# Patient Record
Sex: FEMALE | Race: BLACK OR AFRICAN AMERICAN | NOT HISPANIC OR LATINO | Employment: UNEMPLOYED | URBAN - METROPOLITAN AREA
[De-identification: names, ages, dates, MRNs, and addresses within clinical notes are randomized per-mention and may not be internally consistent; named-entity substitution may affect disease eponyms.]

---

## 2017-02-11 ENCOUNTER — OFFICE VISIT (OUTPATIENT)
Dept: FAMILY MEDICINE CLINIC | Facility: CLINIC | Age: 22
End: 2017-02-11

## 2017-02-11 VITALS
OXYGEN SATURATION: 98 % | SYSTOLIC BLOOD PRESSURE: 118 MMHG | HEART RATE: 89 BPM | TEMPERATURE: 98.3 F | HEIGHT: 63 IN | BODY MASS INDEX: 25.16 KG/M2 | WEIGHT: 142 LBS | DIASTOLIC BLOOD PRESSURE: 72 MMHG

## 2017-02-11 DIAGNOSIS — B00.9 HERPES: ICD-10-CM

## 2017-02-11 DIAGNOSIS — Z01.419 VISIT FOR GYNECOLOGIC EXAMINATION: Primary | ICD-10-CM

## 2017-02-11 DIAGNOSIS — L20.9 ATOPIC DERMATITIS, UNSPECIFIED TYPE: ICD-10-CM

## 2017-02-11 DIAGNOSIS — G47.00 INSOMNIA, UNSPECIFIED TYPE: ICD-10-CM

## 2017-02-11 LAB
BACTERIA UR QL AUTO: ABNORMAL /HPF
BILIRUB UR QL STRIP: NEGATIVE
CLARITY UR: CLEAR
COLOR UR: YELLOW
GLUCOSE UR STRIP-MCNC: NEGATIVE MG/DL
HGB UR QL STRIP.AUTO: NEGATIVE
KETONES UR QL STRIP: ABNORMAL
LEUKOCYTE ESTERASE UR QL STRIP.AUTO: NEGATIVE
NITRITE UR QL STRIP: NEGATIVE
PH UR STRIP.AUTO: 6.5 [PH] (ref 4.6–8)
PROT UR QL STRIP: NEGATIVE
RBC # UR: ABNORMAL /HPF
REF LAB TEST METHOD: ABNORMAL
SP GR UR STRIP: 1.02 (ref 1–1.03)
SQUAMOUS #/AREA URNS HPF: ABNORMAL /HPF
UROBILINOGEN UR QL STRIP: ABNORMAL
WBC UR QL AUTO: ABNORMAL /HPF

## 2017-02-11 PROCEDURE — 81001 URINALYSIS AUTO W/SCOPE: CPT | Performed by: NURSE PRACTITIONER

## 2017-02-11 PROCEDURE — 99395 PREV VISIT EST AGE 18-39: CPT | Performed by: NURSE PRACTITIONER

## 2017-02-11 PROCEDURE — 99212 OFFICE O/P EST SF 10 MIN: CPT | Performed by: NURSE PRACTITIONER

## 2017-02-11 RX ORDER — AMITRIPTYLINE HYDROCHLORIDE 10 MG/1
TABLET, FILM COATED ORAL
Qty: 180 TABLET | Refills: 3 | Status: SHIPPED | OUTPATIENT
Start: 2017-02-11 | End: 2018-01-05 | Stop reason: SDUPTHER

## 2017-02-11 RX ORDER — ACYCLOVIR 400 MG/1
TABLET ORAL
Qty: 15 TABLET | Refills: 5 | Status: SHIPPED | OUTPATIENT
Start: 2017-02-11 | End: 2020-03-20 | Stop reason: SDUPTHER

## 2017-02-11 RX ORDER — TRIAMCINOLONE ACETONIDE 1 MG/G
CREAM TOPICAL 2 TIMES DAILY
Qty: 30 G | Refills: 1 | Status: SHIPPED | OUTPATIENT
Start: 2017-02-11 | End: 2018-01-24 | Stop reason: SDUPTHER

## 2017-02-11 NOTE — PATIENT INSTRUCTIONS
CBE, Pap, Pelvic today, will cont off OCP and monitor enc safe sex, refill elavil 10 mg 2 PO HS, triamcinolone cream aaa prn eczema, and salicylic acid shampoo

## 2017-02-11 NOTE — PROGRESS NOTES
Subjective   Celestino Ba is a 21 y.o. female.     History of Present Illness   Celestino Ba 21 y.o. female  0, Para 0 female who presents for their yearly GYN exam. Periods are regular every 28-30 days, lasting 5 days. Dysmenorrhea:none. Cyclic symptoms include none. No intermenstrual bleeding, spotting, or discharge.  She reports not having additional complaints of breast pain, lumps, nipple dc, vag odor, itch, pain, dc, or dysuria.  Current contraception:  no method at present time, was prev on OCP but states not currently sexually active and doesn't want to take at this time  History of abnormal Pap smear: no never had  Family history of uterine or ovarian cancer: no  Family history of colon cancer: no  History of abnormal mammogram: no never had  Family history of breast cancer: yes - paternal grandmother  Patient declines STD screening. 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    Review of Systems   Constitutional: Negative for fever.   Respiratory: Negative for cough, shortness of breath and wheezing.    Cardiovascular: Negative for chest pain.   Genitourinary: Negative for dysuria, flank pain, frequency, genital sores, menstrual problem, pelvic pain, urgency, vaginal bleeding, vaginal discharge and vaginal pain.   Skin: Positive for rash.   Neurological: Negative for dizziness and headaches.   Psychiatric/Behavioral: Positive for sleep  disturbance. Negative for dysphoric mood and suicidal ideas. The patient is not nervous/anxious.    All other systems reviewed and are negative.      Objective   Physical Exam   Constitutional: She is oriented to person, place, and time. She appears well-developed and well-nourished.   HENT:   Head: Normocephalic and atraumatic.   Eyes: Conjunctivae and EOM are normal. Pupils are equal, round, and reactive to light.   Cardiovascular: Normal rate, regular rhythm and normal heart sounds.    Pulmonary/Chest: Effort normal and breath sounds normal. Right breast exhibits no inverted nipple (nipple ring ), no mass and no skin change. Left breast exhibits no inverted nipple (nipple ring), no mass and no skin change. Breasts are symmetrical. There is no breast swelling.   Genitourinary: Vagina normal and uterus normal. No breast tenderness, discharge or bleeding. Right adnexum displays no tenderness. Left adnexum displays no tenderness.   Musculoskeletal: Normal range of motion.   Neurological: She is alert and oriented to person, place, and time.   Skin: Skin is warm and dry.   Psychiatric: She has a normal mood and affect. Her behavior is normal. Judgment and thought content normal.   Vitals reviewed.      Assessment/Plan   Celestino was seen today for med refill and gynecologic exam.    Diagnoses and all orders for this visit:    Visit for gynecologic examination  -     Urinalysis With Microscopic  -     Pap IG (Image Guided)  -     Urinalysis  -     Urinalysis, Microscopic Only    Insomnia, unspecified type    Herpes    Atopic dermatitis, unspecified type    Other orders  -     amitriptyline (ELAVIL) 10 MG tablet; TAKE 2 TABLETS BY MOUTH AT BEDTIME  -     triamcinolone (KENALOG) 0.1 % cream; Apply  topically 2 (Two) Times a Day.  -     Salicylic Acid 3 % shampoo; Apply three times weekly leave in several minutes, rinse and repeat  -     acyclovir (ZOVIRAX) 400 MG tablet; 1 PO TID x 5 days    CBE, Pap, Pelvic today, will  cont off OCP and monitor enc safe sex, refill elavil 10 mg 2 PO HS, triamcinolone cream aaa prn eczema, and salicylic acid shampoo, refill acyclovir prn outbreak

## 2017-02-15 LAB
CHROM ANALY OVERALL INTERP-IMP: ABNORMAL
CONV .: ABNORMAL
CONV PERFORMED BY:: ABNORMAL
DX ICD CODE: ABNORMAL
HIV 1 & 2 AB SER-IMP: ABNORMAL
PATHOLOGIST NAME: ABNORMAL
PATHOLOGIST PROVIDED ICD-10:: ABNORMAL
RECOM F/U CVX/VAG CYTO: ABNORMAL
REF LAB TEST METHOD: ABNORMAL
STAT OF ADQ CVX/VAG CYTO-IMP: ABNORMAL

## 2017-02-16 LAB — SPECIMEN STATUS: NORMAL

## 2017-02-17 LAB — HPV I/H RISK 1 DNA CVX QL PROBE+SIG AMP: POSITIVE

## 2017-02-20 ENCOUNTER — TELEPHONE (OUTPATIENT)
Dept: FAMILY MEDICINE CLINIC | Facility: CLINIC | Age: 22
End: 2017-02-20

## 2017-02-20 DIAGNOSIS — R87.810 CERVICAL HIGH RISK HPV (HUMAN PAPILLOMAVIRUS) TEST POSITIVE: ICD-10-CM

## 2017-02-20 DIAGNOSIS — R87.610 ATYPICAL SQUAMOUS CELLS OF UNDETERMINED SIGNIFICANCE ON CYTOLOGIC SMEAR OF CERVIX (ASC-US): Primary | ICD-10-CM

## 2017-02-20 NOTE — TELEPHONE ENCOUNTER
First pap with ASCUS and HPV positive, UTD on HPV vaccine-[facility] stated [facility] don't need to see her we will just repap in 1 year, called pt and explained to her 3 PM

## 2017-05-01 ENCOUNTER — TELEPHONE (OUTPATIENT)
Dept: FAMILY MEDICINE CLINIC | Facility: CLINIC | Age: 22
End: 2017-05-01

## 2017-05-16 ENCOUNTER — OFFICE VISIT (OUTPATIENT)
Dept: FAMILY MEDICINE CLINIC | Facility: CLINIC | Age: 22
End: 2017-05-16

## 2017-05-16 VITALS
SYSTOLIC BLOOD PRESSURE: 110 MMHG | TEMPERATURE: 98.4 F | HEIGHT: 63 IN | WEIGHT: 148 LBS | OXYGEN SATURATION: 97 % | BODY MASS INDEX: 26.22 KG/M2 | HEART RATE: 61 BPM | DIASTOLIC BLOOD PRESSURE: 64 MMHG

## 2017-05-16 DIAGNOSIS — N92.6 IRREGULAR MENSES: Primary | ICD-10-CM

## 2017-05-16 PROCEDURE — 99212 OFFICE O/P EST SF 10 MIN: CPT | Performed by: NURSE PRACTITIONER

## 2017-05-30 ENCOUNTER — TELEPHONE (OUTPATIENT)
Dept: FAMILY MEDICINE CLINIC | Facility: CLINIC | Age: 22
End: 2017-05-30

## 2017-05-31 RX ORDER — NORGESTIMATE AND ETHINYL ESTRADIOL 7DAYSX3 28
1 KIT ORAL DAILY
Qty: 28 TABLET | Refills: 8 | Status: SHIPPED | OUTPATIENT
Start: 2017-05-31 | End: 2017-05-31 | Stop reason: SDUPTHER

## 2017-05-31 RX ORDER — NORGESTIMATE AND ETHINYL ESTRADIOL 7DAYSX3 28
1 KIT ORAL DAILY
Qty: 28 TABLET | Refills: 8 | Status: SHIPPED | OUTPATIENT
Start: 2017-05-31 | End: 2018-01-05 | Stop reason: SDUPTHER

## 2017-06-26 ENCOUNTER — TELEPHONE (OUTPATIENT)
Dept: FAMILY MEDICINE CLINIC | Facility: CLINIC | Age: 22
End: 2017-06-26

## 2017-06-26 NOTE — TELEPHONE ENCOUNTER
Pt has 8 refills. They wanted to know if she wants a 3 month supply, is this ok? She's going to college    ---- Message from KATHERYN Del Real sent at 6/26/2017  8:11 AM EDT -----  I thought we refilled birth control pill last mo, can you see if she needs?    ----- Message -----     From: Branden Al     Sent: 6/24/2017   8:56 AM       To: KATHERYN Del Real

## 2018-01-05 ENCOUNTER — OFFICE VISIT (OUTPATIENT)
Dept: FAMILY MEDICINE CLINIC | Facility: CLINIC | Age: 23
End: 2018-01-05

## 2018-01-05 VITALS
SYSTOLIC BLOOD PRESSURE: 120 MMHG | HEIGHT: 63 IN | BODY MASS INDEX: 26.93 KG/M2 | HEART RATE: 84 BPM | WEIGHT: 152 LBS | OXYGEN SATURATION: 97 % | TEMPERATURE: 98.6 F | DIASTOLIC BLOOD PRESSURE: 84 MMHG

## 2018-01-05 DIAGNOSIS — B96.89 BACTERIAL VAGINOSIS: Primary | ICD-10-CM

## 2018-01-05 DIAGNOSIS — Z30.41 FAMILY PLANNING, BCP (BIRTH CONTROL PILLS) MAINTENANCE: ICD-10-CM

## 2018-01-05 DIAGNOSIS — J06.9 ACUTE URI: ICD-10-CM

## 2018-01-05 DIAGNOSIS — N76.0 BACTERIAL VAGINOSIS: Primary | ICD-10-CM

## 2018-01-05 DIAGNOSIS — L20.9 ATOPIC DERMATITIS, UNSPECIFIED TYPE: ICD-10-CM

## 2018-01-05 DIAGNOSIS — L73.9 FOLLICULITIS: ICD-10-CM

## 2018-01-05 DIAGNOSIS — G47.00 INSOMNIA, UNSPECIFIED TYPE: ICD-10-CM

## 2018-01-05 LAB
CLUE CELLS SPEC QL WET PREP: NORMAL
HYDATID CYST SPEC WET PREP: NORMAL
T VAGINALIS SPEC QL WET PREP: NORMAL
WBC SPEC QL WET PREP: NORMAL
YEAST GENITAL QL WET PREP: NORMAL

## 2018-01-05 PROCEDURE — 99214 OFFICE O/P EST MOD 30 MIN: CPT | Performed by: NURSE PRACTITIONER

## 2018-01-05 PROCEDURE — 87210 SMEAR WET MOUNT SALINE/INK: CPT | Performed by: NURSE PRACTITIONER

## 2018-01-05 RX ORDER — TRIAMCINOLONE ACETONIDE 1 MG/G
CREAM TOPICAL
Qty: 30 G | Refills: 1 | Status: CANCELLED | OUTPATIENT
Start: 2018-01-05

## 2018-01-05 RX ORDER — METRONIDAZOLE 500 MG/1
500 TABLET ORAL
Qty: 14 TABLET | Refills: 0 | Status: SHIPPED | OUTPATIENT
Start: 2018-01-05 | End: 2018-02-16

## 2018-01-05 RX ORDER — AMITRIPTYLINE HYDROCHLORIDE 10 MG/1
TABLET, FILM COATED ORAL
Qty: 180 TABLET | Refills: 3 | Status: SHIPPED | OUTPATIENT
Start: 2018-01-05 | End: 2018-08-10

## 2018-01-05 RX ORDER — NORGESTIMATE AND ETHINYL ESTRADIOL 7DAYSX3 28
1 KIT ORAL DAILY
Qty: 28 TABLET | Refills: 2 | Status: SHIPPED | OUTPATIENT
Start: 2018-01-05 | End: 2018-01-24 | Stop reason: SDUPTHER

## 2018-01-05 NOTE — PROGRESS NOTES
Subjective   Celestino Ba is a 22 y.o. female.     History of Present Illness   C/o sinus tenderness and congestion, nasal congestion, ear congestion, HA x few days, no cough wheezing or SOA, NKDA, mom also sick at home but resolved, NKDA, no recent abx > 3 mo, using OTC theraflu and nyquil, states slowly improving  C/o vag bump x 3 wks with vag dc x 1 wk, new BF since 08/17 but using condoms and OCP, last pap 02/11/17 abnl, no missed doses OCP, no vag pain, odor or itch, no dysuria, with hx of folliculitis and shaves, nontender but still present  With insomnia request refill on elavil 10 mg 2 PO HS has been out with sleep disturbance  With eczema on triamcinolone cream aaa prn but request increased amt, no bleaching of skin    The following portions of the patient's history were reviewed and updated as appropriate: allergies, current medications, past family history, past medical history, past social history, past surgical history and problem list.    Review of Systems   Constitutional: Negative for fever.   HENT: Positive for congestion, ear pain, facial swelling, postnasal drip, sinus pain, sinus pressure, sore throat and voice change. Negative for dental problem, drooling, ear discharge, hearing loss, mouth sores, nosebleeds, rhinorrhea, sneezing, tinnitus and trouble swallowing.    Respiratory: Positive for cough. Negative for shortness of breath and wheezing.    Cardiovascular: Negative for chest pain, palpitations and leg swelling.   Genitourinary: Positive for vaginal discharge. Negative for decreased urine volume, difficulty urinating, dyspareunia, dysuria, enuresis, flank pain, frequency, genital sores, hematuria, menstrual problem, pelvic pain, urgency, vaginal bleeding and vaginal pain.   Skin: Positive for rash. Negative for color change, pallor and wound.   Neurological: Positive for headaches. Negative for dizziness.   Psychiatric/Behavioral: Positive for sleep disturbance. Negative for agitation,  behavioral problems, confusion, decreased concentration, dysphoric mood, hallucinations, self-injury and suicidal ideas. The patient is not nervous/anxious and is not hyperactive.    All other systems reviewed and are negative.      Objective   Physical Exam   Constitutional: She is oriented to person, place, and time. She appears well-developed and well-nourished.   HENT:   Head: Normocephalic and atraumatic.   Right Ear: Hearing and tympanic membrane normal.   Left Ear: Hearing and tympanic membrane normal.   Nose: Mucosal edema present. Right sinus exhibits maxillary sinus tenderness. Right sinus exhibits no frontal sinus tenderness. Left sinus exhibits maxillary sinus tenderness. Left sinus exhibits no frontal sinus tenderness.   Mouth/Throat: Posterior oropharyngeal erythema present. No oropharyngeal exudate or posterior oropharyngeal edema.   Eyes: Conjunctivae and EOM are normal. Pupils are equal, round, and reactive to light.   Neck: Normal range of motion. Neck supple. No thyromegaly present.   Cardiovascular: Normal rate, regular rhythm and normal heart sounds.    Pulmonary/Chest: Effort normal and breath sounds normal.   Genitourinary: Vaginal discharge found.   Musculoskeletal: Normal range of motion.   Lymphadenopathy:     She has cervical adenopathy.   Neurological: She is alert and oriented to person, place, and time.   Skin: Skin is warm and dry.   Mild hyperpigmentation and excoriation B UE   Psychiatric: She has a normal mood and affect. Her behavior is normal. Judgment and thought content normal.   Vitals reviewed.      Assessment/Plan   Celestino was seen today for mass and uri.    Diagnoses and all orders for this visit:    Bacterial vaginosis  -     Wet Prep, Genital - Swab, Vagina    Acute URI    Folliculitis    Atopic dermatitis, unspecified type    Insomnia, unspecified type    Family planning, BCP (birth control pills) maintenance    Other orders  -     norgestimate-ethinyl estradiol  (TRI-PREVIFEM) 0.18/0.215/0.25 MG-35 MCG per tablet; Take 1 tablet by mouth Daily.  -     Cancel: triamcinolone (KENALOG) 0.1 % cream; Apply  topically.  -     amitriptyline (ELAVIL) 10 MG tablet; TAKE 2 TABLETS BY MOUTH AT BEDTIME  -     Crisaborole 2 % ointment; Apply 2 Act topically 2 (Two) Times a Day.  -     metroNIDAZOLE (FLAGYL) 500 MG tablet; Take 1 tablet by mouth 2 (Two) Times a Day.    erx flagyl 500 BID x 1 wk, no alcohol cont OCP and condoms to prevent pregancy, if sx persist or worsen call or RTC and will erx abx for URI, trial eucrisa and gave samples, refill elavil and OCP, RTC after 02/18 for repeat pap due

## 2018-01-05 NOTE — PATIENT INSTRUCTIONS
erx flagyl 500 BID x 1 wk, no alcohol cont OCP and condoms to prevent pregancy, if sx persist or worsen call or RTC and will erx abx for URI, trial eucrisa and gave samples, refill elavil and OCP, RTC after 02/18 for repeat pap due

## 2018-01-24 ENCOUNTER — TELEPHONE (OUTPATIENT)
Dept: FAMILY MEDICINE CLINIC | Facility: CLINIC | Age: 23
End: 2018-01-24

## 2018-01-24 RX ORDER — NORGESTIMATE AND ETHINYL ESTRADIOL 7DAYSX3 28
1 KIT ORAL DAILY
Qty: 28 TABLET | Refills: 1 | Status: SHIPPED | OUTPATIENT
Start: 2018-01-24 | End: 2018-02-16 | Stop reason: SDUPTHER

## 2018-01-24 RX ORDER — TRIAMCINOLONE ACETONIDE 1 MG/G
CREAM TOPICAL 3 TIMES DAILY
Qty: 30 G | Refills: 1 | Status: SHIPPED | OUTPATIENT
Start: 2018-01-24 | End: 2019-06-02 | Stop reason: SDUPTHER

## 2018-01-24 NOTE — TELEPHONE ENCOUNTER
THE MEDS CALLED IN FOR ECZEMA, IS TOO EXPENSIVE CAN WE CALL IN WHAT SHE ORIGINALLY USES KENALOG INSTEAD?    SHE LOST HER LAST TWO BIRTH CONTROL PACKS AND SHE NEEDS THOSE REFILLED AS WELL.

## 2018-01-24 NOTE — TELEPHONE ENCOUNTER
I have refilled OCP due for pap after 02/11/18 please make FU apt, also refilled triamcinolone cream

## 2018-01-25 ENCOUNTER — TELEPHONE (OUTPATIENT)
Dept: FAMILY MEDICINE CLINIC | Facility: CLINIC | Age: 23
End: 2018-01-25

## 2018-01-26 ENCOUNTER — TELEPHONE (OUTPATIENT)
Dept: FAMILY MEDICINE CLINIC | Facility: CLINIC | Age: 23
End: 2018-01-26

## 2018-02-16 ENCOUNTER — PROCEDURE VISIT (OUTPATIENT)
Dept: FAMILY MEDICINE CLINIC | Facility: CLINIC | Age: 23
End: 2018-02-16

## 2018-02-16 VITALS
HEART RATE: 75 BPM | HEIGHT: 63 IN | BODY MASS INDEX: 26.93 KG/M2 | TEMPERATURE: 98.3 F | DIASTOLIC BLOOD PRESSURE: 70 MMHG | SYSTOLIC BLOOD PRESSURE: 120 MMHG | OXYGEN SATURATION: 92 % | WEIGHT: 152 LBS

## 2018-02-16 DIAGNOSIS — Z30.41 FAMILY PLANNING, BCP (BIRTH CONTROL PILLS) MAINTENANCE: ICD-10-CM

## 2018-02-16 DIAGNOSIS — R87.610 ASCUS WITH POSITIVE HIGH RISK HPV CERVICAL: ICD-10-CM

## 2018-02-16 DIAGNOSIS — R87.810 ASCUS WITH POSITIVE HIGH RISK HPV CERVICAL: ICD-10-CM

## 2018-02-16 DIAGNOSIS — Z01.419 ENCOUNTER FOR CERVICAL PAP SMEAR WITH PELVIC EXAM: Primary | ICD-10-CM

## 2018-02-16 DIAGNOSIS — R22.9 SKIN NODULE: ICD-10-CM

## 2018-02-16 DIAGNOSIS — Z11.3 SCREEN FOR STD (SEXUALLY TRANSMITTED DISEASE): ICD-10-CM

## 2018-02-16 LAB
B-HCG UR QL: NEGATIVE
BACTERIA UR QL AUTO: ABNORMAL /HPF
BILIRUB UR QL STRIP: NEGATIVE
CLARITY UR: CLEAR
COLOR UR: YELLOW
GLUCOSE UR STRIP-MCNC: NEGATIVE MG/DL
HGB UR QL STRIP.AUTO: ABNORMAL
HIV1 P24 AG SER QL: NORMAL
HIV1+2 AB SER QL: NORMAL
KETONES UR QL STRIP: NEGATIVE
LEUKOCYTE ESTERASE UR QL STRIP.AUTO: NEGATIVE
NITRITE UR QL STRIP: NEGATIVE
PH UR STRIP.AUTO: 7 [PH] (ref 4.6–8)
PROT UR QL STRIP: NEGATIVE
RBC # UR: ABNORMAL /HPF
REF LAB TEST METHOD: ABNORMAL
SP GR UR STRIP: 1.02 (ref 1–1.03)
SQUAMOUS #/AREA URNS HPF: ABNORMAL /HPF
UROBILINOGEN UR QL STRIP: ABNORMAL
WBC UR QL AUTO: ABNORMAL /HPF

## 2018-02-16 PROCEDURE — G0432 EIA HIV-1/HIV-2 SCREEN: HCPCS | Performed by: NURSE PRACTITIONER

## 2018-02-16 PROCEDURE — 99395 PREV VISIT EST AGE 18-39: CPT | Performed by: NURSE PRACTITIONER

## 2018-02-16 PROCEDURE — 86592 SYPHILIS TEST NON-TREP QUAL: CPT | Performed by: NURSE PRACTITIONER

## 2018-02-16 PROCEDURE — 87899 AGENT NOS ASSAY W/OPTIC: CPT | Performed by: NURSE PRACTITIONER

## 2018-02-16 PROCEDURE — 81025 URINE PREGNANCY TEST: CPT | Performed by: NURSE PRACTITIONER

## 2018-02-16 PROCEDURE — 81001 URINALYSIS AUTO W/SCOPE: CPT | Performed by: NURSE PRACTITIONER

## 2018-02-16 PROCEDURE — 36415 COLL VENOUS BLD VENIPUNCTURE: CPT | Performed by: NURSE PRACTITIONER

## 2018-02-16 RX ORDER — NORGESTIMATE AND ETHINYL ESTRADIOL 7DAYSX3 28
1 KIT ORAL DAILY
Qty: 90 TABLET | Refills: 3 | Status: SHIPPED | OUTPATIENT
Start: 2018-02-16 | End: 2018-08-10

## 2018-02-16 NOTE — PATIENT INSTRUCTIONS
CBE, Pap, Pelvic, order HPV high risk and gave pt handout, check labs and call with results, refill OCP, refer derm for consult pt will call to schedule  Human Papillomavirus  Human papillomavirus (HPV) is the most common sexually transmitted infection (STI). It is easy to pass it from person to person (contagious). HPV can cause cervical cancer, anal cancer, and genital warts. The genital warts can be seen and felt. Also, there may be wartlike regions in the throat. HPV may not have any symptoms. It is possible to have HPV for a long time and not know it. You may pass HPV on to others without knowing it.  Follow these instructions at home:  · Take medicines as told by your doctor.  · Use over-the-counter creams for itching as told by your doctor.  · Keep all follow-up visits. Make sure to get Pap tests as told by your doctor.  · Do not touch or scratch the warts.  · Do not treat genital warts with medicines used for treating hand warts.  · Do not have sex while you are getting treatment.  · Do not douche or use tampons during treatment of HPV.  · Tell your sex partner about your infection because he or she may also need treatment.  · If you get pregnant, tell your doctor that you had HPV. Your doctor will watch your pregnancy closely. This is important to keep your baby safe.  · After treatment, use condoms during sex to prevent future infections.  · Have only one sex partner.  · Have a sex partner who does not have other sex partners.  Contact a doctor if:  · The treated skin is red, swollen, or painful.  · You have a fever.  · You feel ill.  · You feel lumps or pimple-like areas in and around your genital area.  · You have bleeding of the vagina or the area that was treated.  · You have pain during sex.  This information is not intended to replace advice given to you by your health care provider. Make sure you discuss any questions you have with your health care provider.  Document Released: 11/30/2009 Document  Revised: 05/25/2017 Document Reviewed: 03/25/2015  [employer] Interactive Patient Education © 2017 [employer]

## 2018-02-16 NOTE — PROGRESS NOTES
Subjective   Celestino Ba is a 22 y.o. female.     History of Present Illness   Celestino Ba 22 y.o. female  0, Para 0 female who presents for their yearly GYN exam. Periods are regular every 28-30 days, lasting 4 days. Dysmenorrhea:none. Cyclic symptoms include breast tenderness. No intermenstrual bleeding, spotting, or discharge.  She reports not having additional complaints of breast pain, lumps, nipple dc, vag dc odor itch or pain, or dysuria.  Current contraception:  OCP no missed doses, monogamous relationship BF x 6 mo  History of abnormal Pap smear: no  Family history of uterine or ovarian cancer: no  Family history of colon cancer: no  History of abnormal mammogram: no  Family history of breast cancer: yes - PGM  Patient desires STD screening, last STD screen  with hx of herpes.    The following portions of the patient's history were reviewed and updated as appropriate: allergies, current medications, past family history, past medical history, past social history, past surgical history and problem list.    Review of Systems   Constitutional: Negative for fever.   Respiratory: Negative for cough, shortness of breath and wheezing.    Cardiovascular: Negative for chest pain, palpitations and leg swelling.   Genitourinary: Negative for decreased urine volume, difficulty urinating, dyspareunia, dysuria, enuresis, flank pain, frequency, genital sores, hematuria, menstrual problem, pelvic pain, urgency, vaginal bleeding, vaginal discharge and vaginal pain.   Skin:        Skin nodule   Neurological: Negative for dizziness and headaches.   All other systems reviewed and are negative.      Objective   Physical Exam   Constitutional: She is oriented to person, place, and time. She appears well-developed and well-nourished.   HENT:   Head: Normocephalic and atraumatic.   Eyes: Conjunctivae and EOM are normal. Pupils are equal, round, and reactive to light.   Cardiovascular: Normal rate, regular rhythm and  normal heart sounds.    Pulmonary/Chest: Effort normal and breath sounds normal. Right breast exhibits no inverted nipple, no mass and no skin change. Left breast exhibits no inverted nipple, no mass and no skin change. Breasts are symmetrical. There is no breast swelling.   B nipple piercings   Genitourinary: Uterus normal.       No breast tenderness, discharge or bleeding. Cervix exhibits no discharge. Right adnexum displays no tenderness. Left adnexum displays no tenderness. Vaginal discharge found.   Musculoskeletal: Normal range of motion.   Neurological: She is alert and oriented to person, place, and time.   Skin: Skin is warm and dry.   Psychiatric: She has a normal mood and affect. Her behavior is normal. Judgment and thought content normal.   Vitals reviewed.      Assessment/Plan   Celestino was seen today for gynecologic exam.    Diagnoses and all orders for this visit:    Encounter for cervical Pap smear with pelvic exam  -     Urinalysis With Microscopic - Urine, Clean Catch  -     Urinalysis - Urine, Clean Catch  -     Urinalysis, Microscopic Only - Urine, Clean Catch  -     PapIG HPV Age Gdln ACOG - ThinPrep Vial, Cervix    Skin nodule    Screen for STD (sexually transmitted disease)  -     Chlamydia trachomatis, Neisseria gonorrhoeae, PCR - Swab, Urine, Clean Catch  -     HIV-1 / O / 2 Ag / Antibody 4th Generation  -     RPR    Family planning, BCP (birth control pills) maintenance  -     Pregnancy, Urine - Urine, Clean Catch    ASCUS with positive high risk HPV cervical  -     PapIG HPV Age Gdln ACOG - ThinPrep Vial, Cervix    Other orders  -     norgestimate-ethinyl estradiol (TRI-PREVIFEM) 0.18/0.215/0.25 MG-35 MCG per tablet; Take 1 tablet by mouth Daily.    CBE, Pap, Pelvic, order HPV high risk and gave pt handout, check labs and call with results, refill OCP, refer derm for consult pt will call to schedule

## 2018-02-19 LAB
C TRACH RRNA SPEC DONR QL NAA+PROBE: NEGATIVE
N GONORRHOEA DNA SPEC QL NAA+PROBE: NEGATIVE
RPR SER QL: NORMAL

## 2018-02-22 LAB
AGE GDLN ACOG TESTING: NORMAL
CHROM ANALY OVERALL INTERP-IMP: ABNORMAL
CONV .: ABNORMAL
CONV .: ABNORMAL
CONV PERFORMED BY:: ABNORMAL
DX ICD CODE: ABNORMAL
HIV 1 & 2 AB SER-IMP: ABNORMAL
HPV I/H RISK 1 DNA CVX QL PROBE+SIG AMP: POSITIVE
PATHOLOGIST NAME: ABNORMAL
PATHOLOGIST PROVIDED ICD-10:: ABNORMAL
REF LAB TEST METHOD: ABNORMAL
STAT OF ADQ CVX/VAG CYTO-IMP: ABNORMAL

## 2018-02-23 ENCOUNTER — TELEPHONE (OUTPATIENT)
Dept: FAMILY MEDICINE CLINIC | Facility: CLINIC | Age: 23
End: 2018-02-23

## 2018-08-10 ENCOUNTER — OFFICE VISIT (OUTPATIENT)
Dept: FAMILY MEDICINE CLINIC | Facility: CLINIC | Age: 23
End: 2018-08-10

## 2018-08-10 VITALS
HEIGHT: 63 IN | SYSTOLIC BLOOD PRESSURE: 112 MMHG | TEMPERATURE: 98.6 F | WEIGHT: 153 LBS | BODY MASS INDEX: 27.11 KG/M2 | DIASTOLIC BLOOD PRESSURE: 60 MMHG | OXYGEN SATURATION: 96 % | HEART RATE: 87 BPM

## 2018-08-10 DIAGNOSIS — J45.20 MILD INTERMITTENT ASTHMA, UNSPECIFIED WHETHER COMPLICATED: ICD-10-CM

## 2018-08-10 DIAGNOSIS — L70.9 ACNE, UNSPECIFIED ACNE TYPE: Primary | ICD-10-CM

## 2018-08-10 DIAGNOSIS — L20.9 ATOPIC DERMATITIS, UNSPECIFIED TYPE: ICD-10-CM

## 2018-08-10 DIAGNOSIS — Z23 NEED FOR HEPATITIS A VACCINATION: ICD-10-CM

## 2018-08-10 PROBLEM — Z30.41 FAMILY PLANNING, BCP (BIRTH CONTROL PILLS) MAINTENANCE: Status: RESOLVED | Noted: 2018-01-05 | Resolved: 2018-08-10

## 2018-08-10 PROBLEM — G47.00 INSOMNIA: Status: RESOLVED | Noted: 2018-01-05 | Resolved: 2018-08-10

## 2018-08-10 PROCEDURE — 90632 HEPA VACCINE ADULT IM: CPT | Performed by: NURSE PRACTITIONER

## 2018-08-10 PROCEDURE — 99213 OFFICE O/P EST LOW 20 MIN: CPT | Performed by: NURSE PRACTITIONER

## 2018-08-10 PROCEDURE — 90471 IMMUNIZATION ADMIN: CPT | Performed by: NURSE PRACTITIONER

## 2018-08-10 RX ORDER — ALBUTEROL SULFATE 90 UG/1
2 AEROSOL, METERED RESPIRATORY (INHALATION) EVERY 4 HOURS PRN
Qty: 1 INHALER | Refills: 2 | Status: SHIPPED | OUTPATIENT
Start: 2018-08-10 | End: 2021-04-05 | Stop reason: SDUPTHER

## 2018-08-10 RX ORDER — TRIAMCINOLONE ACETONIDE 1 MG/G
CREAM TOPICAL 3 TIMES DAILY
Qty: 30 G | Refills: 1 | Status: CANCELLED | OUTPATIENT
Start: 2018-08-10

## 2018-08-10 RX ORDER — ACYCLOVIR 400 MG/1
TABLET ORAL
Qty: 15 TABLET | Refills: 5 | Status: CANCELLED | OUTPATIENT
Start: 2018-08-10

## 2018-08-10 RX ORDER — ADAPALENE AND BENZOYL PEROXIDE .1; 2.5 G/100G; G/100G
GEL TOPICAL
Qty: 45 G | Refills: 1 | Status: SHIPPED | OUTPATIENT
Start: 2018-08-10 | End: 2020-04-24 | Stop reason: SDUPTHER

## 2018-08-10 NOTE — PATIENT INSTRUCTIONS
cont cetaphil BID and trial epiduo gel HS and monitor, trial eucrisa mail order, refill albuterol prn, Hep A vaccine today

## 2018-08-10 NOTE — PROGRESS NOTES
Subjective   Celestino Ba is a 22 y.o. female.     History of Present Illness   C/o acne worsening over the last 3 mo, when stopping OCP tri-previfem 03/18 no longer sexually active and didn't want to take daily as thought c/t mood upset, notes hair thickness now improved off med, no longer using amitriptyline 10 mg 2 PO HS as mood improved, with herpes has acyclovir prn outbreak, with asthma request another inhaler prn, no recent attack no cough, SOA or wheezing, with eczema on cheek prev eucrisa helped but expensive using triamcinolone    The following portions of the patient's history were reviewed and updated as appropriate: allergies, current medications, past family history, past medical history, past social history, past surgical history and problem list.    Review of Systems   Constitutional: Negative for fever.   Respiratory: Negative for cough, shortness of breath and wheezing.    Cardiovascular: Negative for chest pain, palpitations and leg swelling.   Skin: Positive for color change (acne).   Neurological: Negative for dizziness and headaches.   Psychiatric/Behavioral: Negative for agitation, behavioral problems, confusion, decreased concentration, dysphoric mood, hallucinations, self-injury, sleep disturbance and suicidal ideas. The patient is not nervous/anxious and is not hyperactive.    All other systems reviewed and are negative.      Objective   Physical Exam   Constitutional: She is oriented to person, place, and time. She appears well-developed and well-nourished.   HENT:   Head: Normocephalic and atraumatic.   Eyes: Pupils are equal, round, and reactive to light. Conjunctivae and EOM are normal.   Cardiovascular: Normal rate, regular rhythm and normal heart sounds.    Pulmonary/Chest: Effort normal and breath sounds normal.   Musculoskeletal: Normal range of motion.   Neurological: She is alert and oriented to person, place, and time.   Skin: Skin is warm and dry.   Hyperpigmented papules B  forehead scattered, some pustules, no dc or bleeding   Psychiatric: She has a normal mood and affect. Her behavior is normal. Judgment and thought content normal.   Vitals reviewed.      Assessment/Plan   Celestino was seen today for acne.    Diagnoses and all orders for this visit:    Acne, unspecified acne type    Need for hepatitis A vaccination    Atopic dermatitis, unspecified type    Mild intermittent asthma, unspecified whether complicated    Other orders  -     Cancel: acyclovir (ZOVIRAX) 400 MG tablet; 1 PO TID x 5 days  -     Cancel: triamcinolone (KENALOG) 0.1 % cream; Apply  topically to the appropriate area as directed 3 (Three) Times a Day.  -     albuterol (PROVENTIL HFA;VENTOLIN HFA) 108 (90 Base) MCG/ACT inhaler; Inhale 2 puffs Every 4 (Four) Hours As Needed for Wheezing.  -     Crisaborole (EUCRISA) 2 % ointment; Apply 2 application topically 2 (Two) Times a Day. Prn eczema on cheek  -     Adapalene-Benzoyl Peroxide (EPIDUO) 0.1-2.5 % gel; Apply topically to face HS  -     Hepatitis A Vaccine Adult IM    cont cetaphil BID and trial epiduo gel HS and monitor, trial eucrisa mail order, refill albuterol prn, Hep A vaccine today

## 2018-09-07 ENCOUNTER — TELEPHONE (OUTPATIENT)
Dept: FAMILY MEDICINE CLINIC | Facility: CLINIC | Age: 23
End: 2018-09-07

## 2018-09-07 NOTE — TELEPHONE ENCOUNTER
Is calling in regards to: needs birth control, she wants a different one then had before does not remember the name. Was in august 29th

## 2018-09-11 RX ORDER — NORGESTIMATE AND ETHINYL ESTRADIOL 0.25-0.035
1 KIT ORAL DAILY
Qty: 28 TABLET | Refills: 2 | Status: SHIPPED | OUTPATIENT
Start: 2018-09-11 | End: 2018-09-28

## 2018-09-11 NOTE — TELEPHONE ENCOUNTER
Pt would like for you to send Sprintec into the [facility] @ [address] [place] [id number]. thanks

## 2018-09-11 NOTE — TELEPHONE ENCOUNTER
Pt stated she was having SE from the [facility]. It made her hair come out,made her feel different,and very emotional. She had a pregnancy test from the [facility] and they are suppose to be faxing those negative results over.

## 2018-09-11 NOTE — TELEPHONE ENCOUNTER
Sent to pharmacy    HCG negative from scanned lab, we can try sprintec daily and looks like you have a FU apt with GYN this month, is that ok?       She needs to make a at least a lab only apt since she has only ever been on tri-fem OCP when looking through her chart. Why does she want different OCP, was she having SE? We would also need a negative pregnancy test to confirm it was safe to start birth control.

## 2018-09-28 ENCOUNTER — OFFICE VISIT (OUTPATIENT)
Dept: OBSTETRICS AND GYNECOLOGY | Facility: CLINIC | Age: 23
End: 2018-09-28

## 2018-09-28 VITALS
DIASTOLIC BLOOD PRESSURE: 85 MMHG | SYSTOLIC BLOOD PRESSURE: 135 MMHG | WEIGHT: 146 LBS | HEART RATE: 87 BPM | BODY MASS INDEX: 25.86 KG/M2

## 2018-09-28 DIAGNOSIS — L70.9 ACNE, UNSPECIFIED ACNE TYPE: ICD-10-CM

## 2018-09-28 DIAGNOSIS — Z30.09 ENCOUNTER FOR OTHER GENERAL COUNSELING OR ADVICE ON CONTRACEPTION: Primary | ICD-10-CM

## 2018-09-28 PROCEDURE — 99203 OFFICE O/P NEW LOW 30 MIN: CPT | Performed by: OBSTETRICS & GYNECOLOGY

## 2018-09-28 RX ORDER — DROSPIRENONE AND ETHINYL ESTRADIOL 0.03MG-3MG
1 KIT ORAL DAILY
Qty: 28 TABLET | Refills: 12 | Status: SHIPPED | OUTPATIENT
Start: 2018-09-28 | End: 2019-03-14

## 2018-09-28 NOTE — PROGRESS NOTES
Subjective   Celestino Ba is a 22 y.o. female here for consult about periods and hormones.      History of Present Illness   Patient presents today as a new patient and for a birth control consult.  Patient states that she has been on oral contraceptive pills for many years.  She recently stopped them back in March and did have several months of irregular cycles.  She recently restarted Sprintec 1 week ago.  She reports that she has regular cycles on birth control pills.  She does report that she noticed an increase in acne after she stopped taking the pill.  She also reports that she's been on a triphasic pill in the past and had mood swings.    Patient also has history of abnormal pap smears.  She has had ASCUS, positive HPV pap smears two years in a row.  She has had Gardisil vaccine.    The following portions of the patient's history were reviewed and updated as appropriate: allergies, current medications, past family history, past medical history, past social history, past surgical history and problem list.    Review of Systems   Genitourinary: Negative for menstrual problem.   Skin: Positive for skin lesions.   All other systems reviewed and are negative.      Objective   Physical Exam   Constitutional: She appears well-developed and well-nourished.   Cardiovascular: Normal rate and regular rhythm.    Pulmonary/Chest: Effort normal and breath sounds normal.   Abdominal: Soft. She exhibits no distension. There is no tenderness.   Neurological: She is alert.   Psychiatric: She has a normal mood and affect.   Vitals reviewed.        Assessment/Plan   Celestino was seen today for consult.    Diagnoses and all orders for this visit:    Encounter for other general counseling or advice on contraception    Acne, unspecified acne type  -     drospirenone-ethinyl estradiol (JORGE 28) 3-0.03 MG per tablet; Take 1 tablet by mouth Daily.     patient was counseled on hormonal contraceptive options and she would like to continue  on an oral contraceptive pill.  Explained that she may continue on Sprintec or she may try a different pill that could help with acne.  Patient would like to try a different pill and a prescription was sent for Jaky.  Patient was also counseled on need for a repeat Pap smear in February and she was advised that she may schedule that in our office or follow-up with her primary care provider.

## 2018-10-11 ENCOUNTER — TELEPHONE (OUTPATIENT)
Dept: FAMILY MEDICINE CLINIC | Facility: CLINIC | Age: 23
End: 2018-10-11

## 2019-01-14 ENCOUNTER — OFFICE VISIT (OUTPATIENT)
Dept: FAMILY MEDICINE CLINIC | Facility: CLINIC | Age: 24
End: 2019-01-14

## 2019-01-14 VITALS
HEIGHT: 63 IN | DIASTOLIC BLOOD PRESSURE: 70 MMHG | OXYGEN SATURATION: 99 % | WEIGHT: 147 LBS | HEART RATE: 88 BPM | TEMPERATURE: 98.6 F | BODY MASS INDEX: 26.05 KG/M2 | SYSTOLIC BLOOD PRESSURE: 116 MMHG

## 2019-01-14 DIAGNOSIS — N76.0 BACTERIAL VAGINOSIS: Primary | ICD-10-CM

## 2019-01-14 DIAGNOSIS — B96.89 BACTERIAL VAGINOSIS: Primary | ICD-10-CM

## 2019-01-14 LAB
BACTERIA UR QL AUTO: ABNORMAL /HPF
BILIRUB UR QL STRIP: NEGATIVE
CLARITY UR: CLEAR
CLUE CELLS SPEC QL WET PREP: NORMAL
COLOR UR: YELLOW
GLUCOSE UR STRIP-MCNC: NEGATIVE MG/DL
HGB UR QL STRIP.AUTO: ABNORMAL
HYDATID CYST SPEC WET PREP: NORMAL
KETONES UR QL STRIP: NEGATIVE
LEUKOCYTE ESTERASE UR QL STRIP.AUTO: ABNORMAL
NITRITE UR QL STRIP: NEGATIVE
PH UR STRIP.AUTO: 7 [PH] (ref 4.6–8)
PROT UR QL STRIP: NEGATIVE
RBC # UR: ABNORMAL /HPF
REF LAB TEST METHOD: ABNORMAL
SP GR UR STRIP: 1.02 (ref 1–1.03)
SQUAMOUS #/AREA URNS HPF: ABNORMAL /HPF
T VAGINALIS SPEC QL WET PREP: NORMAL
UROBILINOGEN UR QL STRIP: ABNORMAL
WBC SPEC QL WET PREP: NORMAL
WBC UR QL AUTO: ABNORMAL /HPF
YEAST GENITAL QL WET PREP: NORMAL

## 2019-01-14 PROCEDURE — 87220 TISSUE EXAM FOR FUNGI: CPT | Performed by: NURSE PRACTITIONER

## 2019-01-14 PROCEDURE — 87210 SMEAR WET MOUNT SALINE/INK: CPT | Performed by: NURSE PRACTITIONER

## 2019-01-14 PROCEDURE — 99213 OFFICE O/P EST LOW 20 MIN: CPT | Performed by: NURSE PRACTITIONER

## 2019-01-14 PROCEDURE — 81001 URINALYSIS AUTO W/SCOPE: CPT | Performed by: NURSE PRACTITIONER

## 2019-01-14 RX ORDER — METRONIDAZOLE 500 MG/1
500 TABLET ORAL 2 TIMES DAILY
Qty: 14 TABLET | Refills: 0 | Status: SHIPPED | OUTPATIENT
Start: 2019-01-14 | End: 2019-03-14

## 2019-01-14 NOTE — PATIENT INSTRUCTIONS
wet prep shows bacteria erx flagyl 500 mg BID x 1 wk, no alcohol, enc cotton underwear and monitor, will FU with GYN Dr Davies for updated pap and discuss OCP next month

## 2019-01-14 NOTE — PROGRESS NOTES
Subjective   Celestino Ba is a 23 y.o. female.     History of Present Illness   C/o vag dc and odor x 4-5 days with sl itch, no pain or bleeding, no urin pain, odor, freq or itching, no pelvic pain but some cramping with last menses 12/26/18, has BF monogamous and defer STD screening today, last pap 02/18 ASCUS with HPV positive here and sent to GYN Dr Davies on daily OCP no missed doses, last tx BV 01/18 flagyl and tolerated, allergic nuts    The following portions of the patient's history were reviewed and updated as appropriate: allergies, current medications, past family history, past medical history, past social history, past surgical history and problem list.    Review of Systems   Constitutional: Negative for fever.   Respiratory: Negative for cough, shortness of breath and wheezing.    Cardiovascular: Negative for chest pain, palpitations and leg swelling.   Genitourinary: Positive for vaginal discharge. Negative for decreased urine volume, difficulty urinating, dyspareunia, dysuria, enuresis, flank pain, frequency, genital sores, hematuria, menstrual problem, pelvic pain, urgency, vaginal bleeding and vaginal pain.   Neurological: Negative for dizziness and headaches.   All other systems reviewed and are negative.      Objective   Physical Exam   Constitutional: She is oriented to person, place, and time. She appears well-developed and well-nourished.   HENT:   Head: Normocephalic and atraumatic.   Eyes: Conjunctivae and EOM are normal. Pupils are equal, round, and reactive to light.   Cardiovascular: Normal rate, regular rhythm and normal heart sounds.   Pulmonary/Chest: Effort normal and breath sounds normal.   Genitourinary: Vaginal discharge found.   Musculoskeletal: Normal range of motion.   Neurological: She is alert and oriented to person, place, and time.   Skin: Skin is warm and dry.   Psychiatric: She has a normal mood and affect. Her behavior is normal. Judgment and thought content normal.    Vitals reviewed.      Assessment/Plan   Celestino was seen today for vaginitis.    Diagnoses and all orders for this visit:    Bacterial vaginosis  -     Urinalysis With Microscopic - Urine, Clean Catch  -     Urinalysis without microscopic (no culture) - Urine, Clean Catch  -     Urinalysis, Microscopic Only - Urine, Clean Catch  -     Wet Prep, Genital - Swab, Vagina    Other orders  -     metroNIDAZOLE (FLAGYL) 500 MG tablet; Take 1 tablet by mouth 2 (Two) Times a Day.    wet prep shows bacteria erx flagyl 500 mg BID x 1 wk, no alcohol, enc cotton underwear and monitor, will FU with GYN Dr Davies for updated pap and discuss OCP next month

## 2019-03-05 ENCOUNTER — TELEPHONE (OUTPATIENT)
Dept: OBSTETRICS AND GYNECOLOGY | Facility: CLINIC | Age: 24
End: 2019-03-05

## 2019-03-14 ENCOUNTER — OFFICE VISIT (OUTPATIENT)
Dept: FAMILY MEDICINE CLINIC | Facility: CLINIC | Age: 24
End: 2019-03-14

## 2019-03-14 VITALS
TEMPERATURE: 98.5 F | OXYGEN SATURATION: 99 % | WEIGHT: 146.2 LBS | BODY MASS INDEX: 25.91 KG/M2 | SYSTOLIC BLOOD PRESSURE: 120 MMHG | HEIGHT: 63 IN | HEART RATE: 88 BPM | DIASTOLIC BLOOD PRESSURE: 68 MMHG

## 2019-03-14 DIAGNOSIS — N76.0 BV (BACTERIAL VAGINOSIS): ICD-10-CM

## 2019-03-14 DIAGNOSIS — N89.8 VAGINAL DISCHARGE: ICD-10-CM

## 2019-03-14 DIAGNOSIS — Z01.419 ENCOUNTER FOR GYNECOLOGICAL EXAMINATION: Primary | ICD-10-CM

## 2019-03-14 DIAGNOSIS — B96.89 BV (BACTERIAL VAGINOSIS): ICD-10-CM

## 2019-03-14 LAB
CLUE CELLS SPEC QL WET PREP: ABNORMAL
HYDATID CYST SPEC WET PREP: ABNORMAL
T VAGINALIS SPEC QL WET PREP: ABNORMAL
WBC SPEC QL WET PREP: ABNORMAL
YEAST GENITAL QL WET PREP: ABNORMAL

## 2019-03-14 PROCEDURE — 87210 SMEAR WET MOUNT SALINE/INK: CPT | Performed by: NURSE PRACTITIONER

## 2019-03-14 PROCEDURE — 99395 PREV VISIT EST AGE 18-39: CPT | Performed by: NURSE PRACTITIONER

## 2019-03-14 PROCEDURE — 87220 TISSUE EXAM FOR FUNGI: CPT | Performed by: NURSE PRACTITIONER

## 2019-03-14 RX ORDER — CLINDAMYCIN HYDROCHLORIDE 300 MG/1
300 CAPSULE ORAL 3 TIMES DAILY
Qty: 30 CAPSULE | Refills: 0 | Status: SHIPPED | OUTPATIENT
Start: 2019-03-14 | End: 2019-07-22

## 2019-03-14 RX ORDER — FLUCONAZOLE 150 MG/1
TABLET ORAL
Qty: 2 TABLET | Refills: 0 | Status: SHIPPED | OUTPATIENT
Start: 2019-03-14 | End: 2019-07-22

## 2019-03-14 NOTE — PATIENT INSTRUCTIONS
Pap pelvic and breast exam today,   Wet prep today, deferred STD screen today.   Encouraged to use protection with intercourse every encounter, avoid intercourse with any open lesions,   Pending pap results will f/u with GYN if needed.   Increase fluid intake, get plenty of rest.   Patient agrees with plan of care and understands instructions. Call if worsening symptoms or any problems or concerns.

## 2019-03-14 NOTE — PROGRESS NOTES
Subjective   Celestino Ba is a 23 y.o. female.     History of Present Illness     Celestino Ba 23 y.o. female  0, Para 0 female who presents for their yearly GYN exam. Periods are regular every 28-30 days, lasting 5 days. Dysmenorrhea:mild, occurring first 1-2 days of flow. Cyclic symptoms include bloating and moodiness. No intermenstrual bleeding, spotting, or discharge.  She reports not having additional complaints. She has seen Dr. Roseann Hurtado GYN for hx of abnormal pap 2 years in a row with HPV positive, she did have gardasil shots. Recommended repeat pap in  here or with GYN. Denies breast changes, lumps, nipple d/c.     Current contraception:  no method at present time, she states she recently stopped OCP, did not like the way making her feel. Was taking sprintec then GYN changed to  dalia. She is sexually active uses condom, 1 partner, no new partners. States she is feeling better off OCP, does not desire other type of contraceptive at this time. Ok with using condoms. Denies any vaginal d/c.   History of abnormal Pap smear: yes - , ASCUS, HPV positive.   Family history of uterine or ovarian cancer: no  Family history of colon cancer: no  History of abnormal mammogram: no  Family history of breast cancer: yes - paternal grandmother, unsure of age.     Patient declines STD screening.    The following portions of the patient's history were reviewed and updated as appropriate: allergies, current medications, past family history, past medical history, past social history, past surgical history and problem list.    Review of Systems   Constitutional: Negative for chills, diaphoresis and fever.   Respiratory: Negative for cough and shortness of breath.    Cardiovascular: Negative for chest pain.   Genitourinary: Negative for breast discharge, dysuria, vaginal bleeding, vaginal pain, breast lump and breast pain.   Musculoskeletal: Negative for arthralgias and myalgias.   Neurological: Negative for  dizziness, light-headedness and headache.   All other systems reviewed and are negative.      Objective   Physical Exam   Constitutional: She is oriented to person, place, and time. She appears well-developed and well-nourished.   HENT:   Head: Normocephalic.   Eyes: Pupils are equal, round, and reactive to light.   Neck: Normal range of motion.   Cardiovascular: Normal rate, regular rhythm and normal heart sounds.   Pulmonary/Chest: Effort normal and breath sounds normal. Right breast exhibits no inverted nipple, no mass, no nipple discharge, no skin change and no tenderness. Left breast exhibits no inverted nipple, no mass, no nipple discharge, no skin change and no tenderness.   Abdominal: Soft. Bowel sounds are normal. She exhibits no distension. There is no tenderness.   Genitourinary: Pelvic exam was performed with patient supine. There is no rash, tenderness or lesion on the right labia. There is no rash, tenderness or lesion on the left labia. Cervix exhibits discharge. Right adnexum displays no mass and no tenderness. Left adnexum displays no mass and no tenderness. No erythema or bleeding in the vagina. No signs of injury around the vagina. No vaginal discharge found.   Musculoskeletal: Normal range of motion.   Neurological: She is alert and oriented to person, place, and time.   Skin: Skin is warm and dry.   Psychiatric: She has a normal mood and affect. Her behavior is normal.   Nursing note and vitals reviewed.        Assessment/Plan   Celestino was seen today for gynecologic exam.    Diagnoses and all orders for this visit:    Encounter for gynecological examination  -     PapIG HPV Age Gdln ACOG    Vaginal discharge  -     Wet Prep, Genital - Swab, Vagina    BV (bacterial vaginosis)    Other orders  -     clindamycin (CLEOCIN) 300 MG capsule; Take 1 capsule by mouth 3 (Three) Times a Day.  -     fluconazole (DIFLUCAN) 150 MG tablet; Take 1 today, then repeat again in 3 days.        Pap pelvic and breast  exam today,   Wet prep today, deferred STD screen today.   Encouraged to use protection with intercourse every encounter, avoid intercourse with any open lesions,   Pending pap results will f/u with GYN if needed.   Increase fluid intake, get plenty of rest.   Patient agrees with plan of care and understands instructions. Call if worsening symptoms or any problems or concerns.

## 2019-03-19 LAB
AGE GDLN ACOG TESTING: NORMAL
CHROM ANALY OVERALL INTERP-IMP: NORMAL
CONV .: NORMAL
CONV .: NORMAL
CONV PERFORMED BY:: NORMAL
DX ICD CODE: NORMAL
HIV 1 & 2 AB SER-IMP: NORMAL
Lab: NORMAL
REF LAB TEST METHOD: NORMAL
STAT OF ADQ CVX/VAG CYTO-IMP: NORMAL

## 2019-06-03 RX ORDER — TRIAMCINOLONE ACETONIDE 1 MG/G
CREAM TOPICAL 3 TIMES DAILY
Qty: 30 G | Refills: 0 | Status: SHIPPED | OUTPATIENT
Start: 2019-06-03 | End: 2019-07-22 | Stop reason: SDUPTHER

## 2019-07-22 ENCOUNTER — OFFICE VISIT (OUTPATIENT)
Dept: FAMILY MEDICINE CLINIC | Facility: CLINIC | Age: 24
End: 2019-07-22

## 2019-07-22 ENCOUNTER — TELEPHONE (OUTPATIENT)
Dept: FAMILY MEDICINE CLINIC | Facility: CLINIC | Age: 24
End: 2019-07-22

## 2019-07-22 VITALS
SYSTOLIC BLOOD PRESSURE: 120 MMHG | BODY MASS INDEX: 25.16 KG/M2 | WEIGHT: 142 LBS | HEART RATE: 89 BPM | TEMPERATURE: 98.8 F | DIASTOLIC BLOOD PRESSURE: 60 MMHG | OXYGEN SATURATION: 99 % | HEIGHT: 63 IN

## 2019-07-22 DIAGNOSIS — F41.9 ANXIETY AND DEPRESSION: Primary | ICD-10-CM

## 2019-07-22 DIAGNOSIS — L20.9 ATOPIC DERMATITIS, UNSPECIFIED TYPE: ICD-10-CM

## 2019-07-22 DIAGNOSIS — F32.A ANXIETY AND DEPRESSION: Primary | ICD-10-CM

## 2019-07-22 LAB
BACTERIA UR QL AUTO: ABNORMAL /HPF
BILIRUB UR QL STRIP: NEGATIVE
CLARITY UR: CLEAR
COLOR UR: YELLOW
GLUCOSE UR STRIP-MCNC: NEGATIVE MG/DL
HGB UR QL STRIP.AUTO: NEGATIVE
KETONES UR QL STRIP: NEGATIVE
LEUKOCYTE ESTERASE UR QL STRIP.AUTO: NEGATIVE
NITRITE UR QL STRIP: NEGATIVE
PH UR STRIP.AUTO: 7 [PH] (ref 4.6–8)
PROT UR QL STRIP: NEGATIVE
RBC # UR: ABNORMAL /HPF
REF LAB TEST METHOD: ABNORMAL
SP GR UR STRIP: 1.02 (ref 1–1.03)
SQUAMOUS #/AREA URNS HPF: ABNORMAL /HPF
UROBILINOGEN UR QL STRIP: NORMAL
WBC UR QL AUTO: ABNORMAL /HPF

## 2019-07-22 PROCEDURE — 81001 URINALYSIS AUTO W/SCOPE: CPT | Performed by: NURSE PRACTITIONER

## 2019-07-22 PROCEDURE — 99213 OFFICE O/P EST LOW 20 MIN: CPT | Performed by: NURSE PRACTITIONER

## 2019-07-22 RX ORDER — ESCITALOPRAM OXALATE 10 MG/1
TABLET ORAL
Qty: 30 TABLET | Refills: 1 | Status: SHIPPED | OUTPATIENT
Start: 2019-07-22 | End: 2021-02-17

## 2019-07-22 RX ORDER — TRIAMCINOLONE ACETONIDE 1 MG/G
CREAM TOPICAL 3 TIMES DAILY
Qty: 30 G | Refills: 2 | Status: SHIPPED | OUTPATIENT
Start: 2019-07-22 | End: 2020-07-27 | Stop reason: SDUPTHER

## 2019-07-22 NOTE — PATIENT INSTRUCTIONS
trial lexapro 10 mg daily and FU 1 mo eval mood, reviewed SE and if sx persist or worsen call or RTC earlier, defer counseling, refill triamcinolone, defer update blood work request recheck UA no sx today

## 2019-07-22 NOTE — TELEPHONE ENCOUNTER
Pt informed  ----- Message from KATHERYN Del Real sent at 7/22/2019 11:43 AM EDT -----  Urine normal

## 2019-07-22 NOTE — PROGRESS NOTES
Subjective   Celestino Ba is a 23 y.o. female.     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, still with same BF > 3 years and some changes, lives with mom and stepdad supportive, Prev took amitriptyline 10 mg 2 PO for sleep but didn't think helped mood, no other mood medicine tried, no prev counseling, weight loss 5 lbs since 01/19  PALOMA-7=14 today with sx of anxiety, worrying, trouble relaxing, restless, irritable  PHQ-9=16 today with sx of anhedonia, depression, poor sleep and decreased appetite, feeling bad about herself, trouble concentrating, restless, no SI/HI  With chronic eczema on triamcinolone request refill prn, prev eucrisa too expensive    The following portions of the patient's history were reviewed and updated as appropriate: allergies, current medications, past family history, past medical history, past social history, past surgical history and problem list.    Review of Systems   Constitutional: Negative for fatigue and fever.   Respiratory: Negative for cough, shortness of breath and wheezing.    Cardiovascular: Negative for chest pain, palpitations and leg swelling.   Genitourinary: Negative for decreased urine volume, difficulty urinating, dyspareunia, dysuria, enuresis, flank pain, frequency, genital sores, hematuria, menstrual problem, pelvic pain, urgency, vaginal bleeding, vaginal discharge and vaginal pain.   Skin: Positive for rash.   Neurological: Negative for dizziness and headaches.   Psychiatric/Behavioral: Positive for agitation, behavioral problems, decreased concentration, dysphoric mood and sleep disturbance. Negative for confusion, hallucinations, self-injury and suicidal ideas. The patient is nervous/anxious. The patient is not hyperactive.    All other systems reviewed and are negative.      Objective   Physical Exam   Constitutional: She is oriented to  person, place, and time. She appears well-developed and well-nourished.   HENT:   Head: Normocephalic and atraumatic.   Eyes: Conjunctivae and EOM are normal. Pupils are equal, round, and reactive to light.   Cardiovascular: Normal rate, regular rhythm and normal heart sounds.   Pulmonary/Chest: Effort normal and breath sounds normal.   Musculoskeletal: Normal range of motion.   Neurological: She is alert and oriented to person, place, and time.   Skin: Skin is warm and dry.   Psychiatric: Her behavior is normal. Judgment and thought content normal.   flat   Vitals reviewed.      Assessment/Plan   Celestino was seen today for anxiety.    Diagnoses and all orders for this visit:    Anxiety and depression  -     Urinalysis With Microscopic - Urine, Clean Catch  -     Urinalysis without microscopic (no culture) - Urine, Clean Catch  -     Urinalysis, Microscopic Only - Urine, Clean Catch    Atopic dermatitis, unspecified type  -     Urinalysis With Microscopic - Urine, Clean Catch  -     Urinalysis without microscopic (no culture) - Urine, Clean Catch  -     Urinalysis, Microscopic Only - Urine, Clean Catch    Other orders  -     triamcinolone (KENALOG) 0.1 % cream; Apply  topically to the appropriate area as directed 3 (Three) Times a Day.  -     escitalopram (LEXAPRO) 10 MG tablet; 1/2 PO x 4 days then 1 PO daily for stress    trial lexapro 10 mg daily and FU 1 mo eval mood, reviewed SE and if sx persist or worsen call or RTC earlier, defer counseling, refill triamcinolone, defer update blood work request recheck UA no sx today

## 2019-07-30 ENCOUNTER — TELEPHONE (OUTPATIENT)
Dept: FAMILY MEDICINE CLINIC | Facility: CLINIC | Age: 24
End: 2019-07-30

## 2019-07-31 ENCOUNTER — TELEPHONE (OUTPATIENT)
Dept: FAMILY MEDICINE CLINIC | Facility: CLINIC | Age: 24
End: 2019-07-31

## 2019-08-28 ENCOUNTER — TELEPHONE (OUTPATIENT)
Dept: FAMILY MEDICINE CLINIC | Facility: CLINIC | Age: 24
End: 2019-08-28

## 2019-08-30 RX ORDER — NORGESTIMATE AND ETHINYL ESTRADIOL 7DAYSX3 28
1 KIT ORAL DAILY
Qty: 28 TABLET | Refills: 12 | Status: SHIPPED | OUTPATIENT
Start: 2019-08-30 | End: 2019-09-09 | Stop reason: SDUPTHER

## 2019-08-30 NOTE — TELEPHONE ENCOUNTER
erx to pharmacy, Recommend back up method x 2 wks to prevent unintended pregnancy when restarting OCP

## 2019-09-09 ENCOUNTER — TELEPHONE (OUTPATIENT)
Dept: FAMILY MEDICINE CLINIC | Facility: CLINIC | Age: 24
End: 2019-09-09

## 2019-09-09 RX ORDER — NORGESTIMATE AND ETHINYL ESTRADIOL 7DAYSX3 28
1 KIT ORAL DAILY
Qty: 84 TABLET | Refills: 3 | Status: SHIPPED | OUTPATIENT
Start: 2019-09-09 | End: 2020-04-24

## 2019-09-09 NOTE — TELEPHONE ENCOUNTER
Patient just needs refill of the Tri-Pevifem e-scribed to [facility] in [place] requesting 3 months supply.

## 2020-03-19 ENCOUNTER — TELEPHONE (OUTPATIENT)
Dept: FAMILY MEDICINE CLINIC | Facility: CLINIC | Age: 25
End: 2020-03-19

## 2020-03-20 RX ORDER — HYDROXYZINE HYDROCHLORIDE 10 MG/1
10 TABLET, FILM COATED ORAL EVERY 8 HOURS PRN
Qty: 60 TABLET | Refills: 1 | Status: SHIPPED | OUTPATIENT
Start: 2020-03-20 | End: 2020-05-29

## 2020-03-20 RX ORDER — ACYCLOVIR 400 MG/1
TABLET ORAL
Qty: 15 TABLET | Refills: 5 | Status: SHIPPED | OUTPATIENT
Start: 2020-03-20 | End: 2021-04-05 | Stop reason: SDUPTHER

## 2020-03-20 RX ORDER — NAPROXEN 500 MG/1
500 TABLET ORAL 2 TIMES DAILY WITH MEALS
Qty: 60 TABLET | Refills: 1 | Status: SHIPPED | OUTPATIENT
Start: 2020-03-20 | End: 2021-04-12 | Stop reason: SDUPTHER
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
## 2020-04-23 ENCOUNTER — TELEPHONE (OUTPATIENT)
Dept: FAMILY MEDICINE CLINIC | Facility: CLINIC | Age: 25
End: 2020-04-23

## 2020-04-23 NOTE — TELEPHONE ENCOUNTER
PATIENT CALLED IN AND STATED SHE NEEDS A CALL BACK  TO CHANGE HER BIRTH CONTROL    PATIENT CALL BACK [phone].

## 2020-04-24 ENCOUNTER — OFFICE VISIT (OUTPATIENT)
Dept: FAMILY MEDICINE CLINIC | Facility: CLINIC | Age: 25
End: 2020-04-24

## 2020-04-24 DIAGNOSIS — L70.0 CYSTIC ACNE: Primary | ICD-10-CM

## 2020-04-24 DIAGNOSIS — Z30.41 FAMILY PLANNING, BCP (BIRTH CONTROL PILLS) MAINTENANCE: ICD-10-CM

## 2020-04-24 PROCEDURE — 99442 PR PHYS/QHP TELEPHONE EVALUATION 11-20 MIN: CPT | Performed by: NURSE PRACTITIONER

## 2020-04-24 RX ORDER — ADAPALENE AND BENZOYL PEROXIDE .1; 2.5 G/100G; G/100G
GEL TOPICAL
Qty: 45 G | Refills: 1 | Status: SHIPPED | OUTPATIENT
Start: 2020-04-24 | End: 2020-05-29

## 2020-04-24 RX ORDER — DROSPIRENONE AND ETHINYL ESTRADIOL 0.03MG-3MG
1 KIT ORAL DAILY
Qty: 24 TABLET | Refills: 11 | Status: SHIPPED | OUTPATIENT
Start: 2020-04-24 | End: 2020-05-29

## 2020-04-24 NOTE — PROGRESS NOTES
Subjective   Celestino Ba is a 24 y.o. female.     History of Present Illness   Here to FU on OCP on tri-previfem since 11/19 but stopped 02/20 as didn't think was helping with hormonal acne, Prev took sprintec from GYN but mood swings, now wondering about different OCP to target acne, sexually active uses condom every time, 1 partner, no new partners, Last STD screening 02/18 negative, G0, P0 female, LMP 04/17/20-04/22/20, Periods are regular every 28-30 days, lasting 5 days. Denies painful cramping or heavy flow this cycle but states usually has cramps first few days of cycle with heavy flow. No intermenstrual bleeding, spotting, or discharge. Denies breast changes, lumps, nipple d/c. Denies vag or urin sx, She has seen Dr. Roseann Hurtado GYN for hx of abnormal pap ASCUS and HPV positive 2018, UTD gardasil, last pap 03/19 Neg here, never smoker  Family history of uterine or ovarian cancer: no  Family history of colon cancer: no  History of abnormal mammogram: no  Family history of breast cancer: yes - paternal grandmother, unsure of age.   States acne worse with start of cycle and using face wash with salicylic acid and vitamin c serum and spot treatment, now wondering about retin cream as well prev used benzoyl peroxide topical epiduo request refill    The following portions of the patient's history were reviewed and updated as appropriate: allergies, current medications, past family history, past medical history, past social history, past surgical history and problem list.    Review of Systems   Constitutional: Negative for fever.   Respiratory: Negative for cough, shortness of breath and wheezing.    Cardiovascular: Negative for chest pain, palpitations and leg swelling.   Genitourinary: Negative for decreased urine volume, difficulty urinating, dyspareunia, dysuria, enuresis, flank pain, frequency, genital sores, hematuria, menstrual problem, pelvic pain, urgency, vaginal bleeding, vaginal discharge and vaginal  pain.   Skin: Positive for color change (acne).   Neurological: Negative for dizziness and headaches.   All other systems reviewed and are negative.      Objective   Physical Exam   Constitutional: She is oriented to person, place, and time.   Pulmonary/Chest: Effort normal.   Neurological: She is alert and oriented to person, place, and time.   Psychiatric: She has a normal mood and affect. Her behavior is normal. Judgment and thought content normal.       Assessment/Plan   Diagnoses and all orders for this visit:    Cystic acne    Family planning, BCP (birth control pills) maintenance    Other orders  -     drospirenone-ethinyl estradiol (Jaky 28) 3-0.03 MG per tablet; Take 1 tablet by mouth Daily.  -     Adapalene-Benzoyl Peroxide (Epiduo) 0.1-2.5 % gel; Apply topically to face HS    You have chosen to receive care through a telephone visit. Do you consent to use a telephone visit for your medical care today? Yes spent 11 minutes on phone with patient, just finished cycle will have Sunday start with new OCP jaky, unable to check HCG in office, monitor sx on new OCP, will RTC 1-2 mo overdue pap pending pandemic stable, restart epiduo cont topical face wash and vit C serum and monitor, consider OTC retin A prn

## 2020-05-29 ENCOUNTER — OFFICE VISIT (OUTPATIENT)
Dept: FAMILY MEDICINE CLINIC | Facility: CLINIC | Age: 25
End: 2020-05-29

## 2020-05-29 VITALS
WEIGHT: 145 LBS | OXYGEN SATURATION: 99 % | TEMPERATURE: 98 F | HEIGHT: 63 IN | HEART RATE: 62 BPM | BODY MASS INDEX: 25.69 KG/M2 | RESPIRATION RATE: 18 BRPM | SYSTOLIC BLOOD PRESSURE: 120 MMHG | DIASTOLIC BLOOD PRESSURE: 70 MMHG

## 2020-05-29 DIAGNOSIS — Z00.00 ANNUAL PHYSICAL EXAM: ICD-10-CM

## 2020-05-29 DIAGNOSIS — Z11.3 SCREEN FOR STD (SEXUALLY TRANSMITTED DISEASE): ICD-10-CM

## 2020-05-29 DIAGNOSIS — Z30.41 FAMILY PLANNING, BCP (BIRTH CONTROL PILLS) MAINTENANCE: ICD-10-CM

## 2020-05-29 DIAGNOSIS — L70.0 CYSTIC ACNE: ICD-10-CM

## 2020-05-29 DIAGNOSIS — Z01.411 ENCOUNTER FOR GYNECOLOGICAL EXAMINATION WITH ABNORMAL FINDING: Primary | ICD-10-CM

## 2020-05-29 DIAGNOSIS — R87.810 CERVICAL HIGH RISK HPV (HUMAN PAPILLOMAVIRUS) TEST POSITIVE: ICD-10-CM

## 2020-05-29 DIAGNOSIS — F41.1 GENERALIZED ANXIETY DISORDER: ICD-10-CM

## 2020-05-29 LAB
25(OH)D3 SERPL-MCNC: 11.1 NG/ML (ref 30–100)
ALBUMIN SERPL-MCNC: 4.2 G/DL (ref 3.5–5.2)
ALBUMIN/GLOB SERPL: 1.6 G/DL
ALP SERPL-CCNC: 34 U/L (ref 39–117)
ALT SERPL W P-5'-P-CCNC: 9 U/L (ref 1–33)
ANION GAP SERPL CALCULATED.3IONS-SCNC: 7.9 MMOL/L (ref 5–15)
AST SERPL-CCNC: 15 U/L (ref 1–32)
BILIRUB SERPL-MCNC: <0.2 MG/DL (ref 0.2–1.2)
BUN BLD-MCNC: 8 MG/DL (ref 6–20)
BUN/CREAT SERPL: 9.6 (ref 7–25)
CALCIUM SPEC-SCNC: 9.1 MG/DL (ref 8.6–10.5)
CHLORIDE SERPL-SCNC: 104 MMOL/L (ref 98–107)
CO2 SERPL-SCNC: 25.1 MMOL/L (ref 22–29)
CREAT BLD-MCNC: 0.83 MG/DL (ref 0.57–1)
ERYTHROCYTE [DISTWIDTH] IN BLOOD BY AUTOMATED COUNT: 12.7 % (ref 12.3–15.4)
GFR SERPL CREATININE-BSD FRML MDRD: 102 ML/MIN/1.73
GLOBULIN UR ELPH-MCNC: 2.6 GM/DL
GLUCOSE BLD-MCNC: 90 MG/DL (ref 65–99)
HCG SERPL QL: NEGATIVE
HCT VFR BLD AUTO: 37.4 % (ref 34–46.6)
HGB BLD-MCNC: 12.2 G/DL (ref 12–15.9)
HIV1+2 AB SER QL: NORMAL
LYMPHOCYTES # BLD AUTO: 2.3 10*3/MM3 (ref 0.7–3.1)
LYMPHOCYTES NFR BLD AUTO: 48.8 % (ref 19.6–45.3)
MCH RBC QN AUTO: 29.5 PG (ref 26.6–33)
MCHC RBC AUTO-ENTMCNC: 32.6 G/DL (ref 31.5–35.7)
MCV RBC AUTO: 90.2 FL (ref 79–97)
MONOCYTES # BLD AUTO: 0.1 10*3/MM3 (ref 0.1–0.9)
MONOCYTES NFR BLD AUTO: 2.2 % (ref 5–12)
NEUTROPHILS # BLD AUTO: 2.3 10*3/MM3 (ref 1.7–7)
NEUTROPHILS NFR BLD AUTO: 49 % (ref 42.7–76)
PLATELET # BLD AUTO: 277 10*3/MM3 (ref 140–450)
PMV BLD AUTO: 8.6 FL (ref 6–12)
POTASSIUM BLD-SCNC: 3.6 MMOL/L (ref 3.5–5.2)
PROT SERPL-MCNC: 6.8 G/DL (ref 6–8.5)
RBC # BLD AUTO: 4.14 10*6/MM3 (ref 3.77–5.28)
SODIUM BLD-SCNC: 137 MMOL/L (ref 136–145)
TSH SERPL DL<=0.05 MIU/L-ACNC: 2.31 UIU/ML (ref 0.27–4.2)
WBC NRBC COR # BLD: 4.7 10*3/MM3 (ref 3.4–10.8)

## 2020-05-29 PROCEDURE — 84703 CHORIONIC GONADOTROPIN ASSAY: CPT | Performed by: NURSE PRACTITIONER

## 2020-05-29 PROCEDURE — 82306 VITAMIN D 25 HYDROXY: CPT | Performed by: NURSE PRACTITIONER

## 2020-05-29 PROCEDURE — G0432 EIA HIV-1/HIV-2 SCREEN: HCPCS | Performed by: NURSE PRACTITIONER

## 2020-05-29 PROCEDURE — 99395 PREV VISIT EST AGE 18-39: CPT | Performed by: NURSE PRACTITIONER

## 2020-05-29 PROCEDURE — 86592 SYPHILIS TEST NON-TREP QUAL: CPT | Performed by: NURSE PRACTITIONER

## 2020-05-29 PROCEDURE — 36415 COLL VENOUS BLD VENIPUNCTURE: CPT | Performed by: NURSE PRACTITIONER

## 2020-05-29 PROCEDURE — 84443 ASSAY THYROID STIM HORMONE: CPT | Performed by: NURSE PRACTITIONER

## 2020-05-29 PROCEDURE — 80053 COMPREHEN METABOLIC PANEL: CPT | Performed by: NURSE PRACTITIONER

## 2020-05-29 PROCEDURE — 85025 COMPLETE CBC W/AUTO DIFF WBC: CPT | Performed by: NURSE PRACTITIONER

## 2020-05-29 RX ORDER — NORGESTIMATE AND ETHINYL ESTRADIOL 7DAYSX3 LO
1 KIT ORAL DAILY
Qty: 28 TABLET | Refills: 12 | Status: SHIPPED | OUTPATIENT
Start: 2020-05-29 | End: 2021-02-17

## 2020-05-29 NOTE — PROGRESS NOTES
Subjective   Celestino Ba is a 24 y.o. female.     History of Present Illness   Celestino Ba 24 y.o. female  0, Para 0 female who presents for their yearly GYN exam. Periods are regular every 28-30 days, lasting 5 days. Dysmenorrhea: mild, occurring first 1-2 days of flow. Cyclic symptoms include bloating and moodiness. No intermenstrual bleeding, spotting, or discharge. No vag dc, odor, itch, pain, dysuria, breast pain, lumps, nipple discharge  She has seen Dr. Roseann Hurtado GYN for hx of abnormal pap 2 years in a row with HPV positive, she did have gardasil shots.   Current contraception: dalia started last month but causing migraines, prev sprintec caused mood upset, states prev took ortho tricyclen and tolerated request change back, wants to continue OCP for cystic acne thinks improving since starting dalia last month  History of abnormal Pap smear: yes - , ASCUS, HPV positive,  Neg  Family history of uterine or ovarian cancer: no  Family history of colon cancer: no  History of abnormal mammogram: no  Family history of breast cancer: yes - paternal grandmother, unsure of age.   Patient declines STD screening. Last STD screening , same sexual partner  With chronic anxiety on lexapro 10 mg but feels like too strong wondering if can decrease dose, mood stable living with supportive mom, unemployed as job at college stopped d/t pandemic, supportive BF in town    The following portions of the patient's history were reviewed and updated as appropriate: allergies, current medications, past family history, past medical history, past social history, past surgical history and problem list.    Review of Systems   Constitutional: Negative for fever.   Respiratory: Negative for cough, shortness of breath and wheezing.    Cardiovascular: Negative for chest pain, palpitations and leg swelling.   Genitourinary: Negative for decreased urine volume, difficulty urinating, dyspareunia, dysuria, enuresis,  flank pain, frequency, genital sores, hematuria, menstrual problem, pelvic pain, urgency, vaginal bleeding, vaginal discharge and vaginal pain.   Skin:        Acne scarring B cheeks   Neurological: Positive for headaches. Negative for dizziness.   Psychiatric/Behavioral: The patient is nervous/anxious (well controlled).    All other systems reviewed and are negative.      Objective   Physical Exam   Constitutional: She is oriented to person, place, and time. She appears well-developed and well-nourished.   HENT:   Head: Normocephalic and atraumatic.   Eyes: Pupils are equal, round, and reactive to light. Conjunctivae and EOM are normal.   Cardiovascular: Normal rate, regular rhythm and normal heart sounds.   Pulmonary/Chest: Effort normal and breath sounds normal. Right breast exhibits no inverted nipple, no mass and no skin change. Left breast exhibits no inverted nipple, no mass and no skin change. No breast swelling, tenderness, discharge or bleeding. Breasts are symmetrical.   Genitourinary: Uterus normal. Cervix exhibits no discharge. Right adnexum displays no tenderness. Left adnexum displays no tenderness. Vaginal discharge (scant) found.   Musculoskeletal: Normal range of motion.   Neurological: She is alert and oriented to person, place, and time.   Skin: Skin is warm and dry.   Psychiatric: She has a normal mood and affect. Her behavior is normal. Judgment and thought content normal.   Vitals reviewed.      Assessment/Plan   Celestino was seen today for gynecologic exam.    Diagnoses and all orders for this visit:    Encounter for gynecological examination with abnormal finding  -     PapIG HPV Age Gdln ACOG    Cervical high risk HPV (human papillomavirus) test positive  -     PapIG HPV Age Gdln ACOG    Family planning, BCP (birth control pills) maintenance  -     hCG, Serum, Qualitative    Cystic acne    Screen for STD (sexually transmitted disease)  -     HSV 2 Antibody, IgG  -     RPR  -     Chlamydia  trachomatis, Neisseria gonorrhoeae, PCR - , Cervix  -     HIV-1 / O / 2 Ag / Antibody 4th Generation    Annual physical exam  -     CBC & Differential  -     Comprehensive Metabolic Panel  -     TSH  -     Vitamin D 25 Hydroxy  -     CBC Auto Differential    Generalized anxiety disorder    Other orders  -     norgestimate-ethinyl estradiol (ORTHO TRI-CYCLEN LO) 0.18/0.215/0.25 MG-25 MCG per tablet; Take 1 tablet by mouth Daily.    CBE, pap, pelvic, check labs and call with results, stop dalia and change ortho tricyclen and monitor for SE, If fails ortho tricyclen consider loestrin, decrease lexapro 5 mg and monitor mood

## 2020-05-29 NOTE — PATIENT INSTRUCTIONS
CBE, pap, pelvic, check labs and call with results, stop dalia and change ortho tricyclen and monitor for SE, If fails ortho tricyclen consider loestrin, decrease lexapro 5 mg and monitor mood

## 2020-05-30 LAB
HSV2 IGG SER IA-ACNC: 9.85 INDEX (ref 0–0.9)
RPR SER QL: NORMAL

## 2020-06-01 LAB
AGE GDLN ACOG TESTING: NORMAL
C TRACH RRNA SPEC DONR QL NAA+PROBE: NEGATIVE
CHROM ANALY OVERALL INTERP-IMP: NORMAL
CONV .: NORMAL
CONV .: NORMAL
CONV PERFORMED BY:: NORMAL
DX ICD CODE: NORMAL
HIV 1 & 2 AB SER-IMP: NORMAL
Lab: NORMAL
N GONORRHOEA DNA SPEC QL NAA+PROBE: NEGATIVE
REF LAB TEST METHOD: NORMAL
STAT OF ADQ CVX/VAG CYTO-IMP: NORMAL

## 2020-06-02 RX ORDER — ERGOCALCIFEROL 1.25 MG/1
50000 CAPSULE ORAL WEEKLY
Qty: 12 CAPSULE | Refills: 0 | Status: SHIPPED | OUTPATIENT
Start: 2020-06-02 | End: 2020-08-17

## 2020-07-27 RX ORDER — TRIAMCINOLONE ACETONIDE 1 MG/G
CREAM TOPICAL 3 TIMES DAILY
Qty: 30 G | Refills: 2 | Status: SHIPPED | OUTPATIENT
Start: 2020-07-27 | End: 2021-02-18 | Stop reason: SDUPTHER

## 2020-07-29 ENCOUNTER — TELEPHONE (OUTPATIENT)
Dept: FAMILY MEDICINE CLINIC | Facility: CLINIC | Age: 25
End: 2020-07-29

## 2020-07-29 RX ORDER — TRIAMCINOLONE ACETONIDE 1 MG/G
CREAM TOPICAL 3 TIMES DAILY
Qty: 30 G | Refills: 2 | Status: CANCELLED | OUTPATIENT
Start: 2020-07-29

## 2020-08-17 RX ORDER — ERGOCALCIFEROL 1.25 MG/1
50000 CAPSULE ORAL WEEKLY
Qty: 12 CAPSULE | Refills: 0 | Status: SHIPPED | OUTPATIENT
Start: 2020-08-17 | End: 2020-11-17

## 2020-11-03 RX ORDER — NORGESTIMATE AND ETHINYL ESTRADIOL
KIT
Qty: 84 TABLET | Refills: 1 | Status: SHIPPED | OUTPATIENT
Start: 2020-11-03 | End: 2021-02-17

## 2020-11-17 RX ORDER — ERGOCALCIFEROL 1.25 MG/1
50000 CAPSULE ORAL WEEKLY
Qty: 13 CAPSULE | Refills: 1 | Status: SHIPPED | OUTPATIENT
Start: 2020-11-17 | End: 2021-04-05 | Stop reason: SDUPTHER

## 2021-02-17 ENCOUNTER — OFFICE VISIT (OUTPATIENT)
Dept: FAMILY MEDICINE CLINIC | Facility: CLINIC | Age: 26
End: 2021-02-17

## 2021-02-17 DIAGNOSIS — L70.9 ACNE, UNSPECIFIED ACNE TYPE: Primary | ICD-10-CM

## 2021-02-17 DIAGNOSIS — E55.9 VITAMIN D DEFICIENCY: ICD-10-CM

## 2021-02-17 DIAGNOSIS — R53.82 CHRONIC FATIGUE: ICD-10-CM

## 2021-02-17 PROCEDURE — 99442 PR PHYS/QHP TELEPHONE EVALUATION 11-20 MIN: CPT | Performed by: NURSE PRACTITIONER

## 2021-02-17 RX ORDER — SPIRONOLACTONE 25 MG/1
25 TABLET ORAL DAILY
Qty: 30 TABLET | Refills: 1 | Status: SHIPPED | OUTPATIENT
Start: 2021-02-17 | End: 2021-03-12

## 2021-02-17 RX ORDER — SPIRONOLACTONE 25 MG/1
25 TABLET ORAL DAILY
Qty: 30 TABLET | Refills: 1 | Status: SHIPPED | OUTPATIENT
Start: 2021-02-17 | End: 2021-02-17 | Stop reason: SDUPTHER

## 2021-02-17 NOTE — PATIENT INSTRUCTIONS
You have chosen to receive care through a telephone visit. Do you consent to use a telephone visit for your medical care today? Yes spent 14 min on phone with patient, will stop OCP and trial spironolactone 25 mg daily and monitor acne, FU in clinic 1-2 mo recheck labs and eval skin, consider adding abx wash if needed or derm consult, cont MVI and vit D supplement and will update labs at FU apt

## 2021-02-17 NOTE — PROGRESS NOTES
Chief Complaint  Acne and Fatigue    Subjective          Celestino Ba presents to [facility]  History of Present Illness  C/o worsening acne on cheeks and chin and doesn't want to use OCP daily now using aveeno salicylic acid face wash and moisturizer and has [facility] spot tx, never tried spironolactone and doesn't see derm, not sexually active and currently on menses, notes come prior to cycle and thinks hormonally based, causing hyperpigmentation and has vit C oil and prev glycolic peel last year helped but notes prev glycolic spot tx was too drying, With chronic fatigue and vit D on weekly supplement and gummy vitamin, states too much sleeping but denies depression and anxiety, lives with mom supportive, no longer taking lexapro too sedating    Review of Systems   Constitutional: Positive for fatigue.   Respiratory: Negative for cough, shortness of breath and wheezing.    Cardiovascular: Negative for chest pain, palpitations and leg swelling.   Skin:        acne   Neurological: Negative for dizziness and light-headedness.   Psychiatric/Behavioral: Positive for sleep disturbance. Negative for dysphoric mood. The patient is not nervous/anxious.    All other systems reviewed and are negative.    Objective   Vital Signs:   There were no vitals taken for this visit.    Physical Exam  Vitals signs reviewed.   Constitutional:       Appearance: She is well-developed.   Pulmonary:      Effort: Pulmonary effort is normal.   Neurological:      Mental Status: She is alert and oriented to person, place, and time.   Psychiatric:         Mood and Affect: Mood normal.         Behavior: Behavior normal.         Thought Content: Thought content normal.         Judgment: Judgment normal.        Result Review :                 Assessment and Plan    Diagnoses and all orders for this visit:    1. Acne, unspecified acne type (Primary)    2. Chronic fatigue    3. Vitamin D deficiency    Other  orders  -     Discontinue: spironolactone (Aldactone) 25 MG tablet; Take 1 tablet by mouth Daily.  Dispense: 30 tablet; Refill: 1  -     spironolactone (Aldactone) 25 MG tablet; Take 1 tablet by mouth Daily.  Dispense: 30 tablet; Refill: 1      I spent 14 minutes caring for Celestino on this date of service. This time includes time spent by me in the following activities:reviewing tests, obtaining and/or reviewing a separately obtained history, performing a medically appropriate examination and/or evaluation , counseling and educating the patient/family/caregiver, ordering medications, tests, or procedures and documenting information in the medical record  Follow Up   No follow-ups on file.  Patient was given instructions and counseling regarding her condition or for health maintenance advice. Please see specific information pulled into the AVS if appropriate.     You have chosen to receive care through a telephone visit. Do you consent to use a telephone visit for your medical care today? Yes spent 14 min on phone with patient, will stop OCP and trial spironolactone 25 mg daily and monitor acne, FU in clinic 1-2 mo recheck labs and eval skin, consider adding abx wash if needed or derm consult, cont MVI and vit D supplement and will update labs at FU apt

## 2021-02-19 RX ORDER — NORGESTIMATE AND ETHINYL ESTRADIOL 7DAYSX3 28
1 KIT ORAL DAILY
Qty: 28 TABLET | Refills: 12 | Status: SHIPPED | OUTPATIENT
Start: 2021-02-19 | End: 2021-06-03 | Stop reason: ALTCHOICE

## 2021-02-19 RX ORDER — TRIAMCINOLONE ACETONIDE 1 MG/G
CREAM TOPICAL 2 TIMES DAILY
Qty: 30 G | Refills: 2 | Status: SHIPPED | OUTPATIENT
Start: 2021-02-19

## 2021-03-12 RX ORDER — SPIRONOLACTONE 25 MG/1
TABLET ORAL
Qty: 30 TABLET | Refills: 1 | Status: SHIPPED | OUTPATIENT
Start: 2021-03-12 | End: 2021-03-30 | Stop reason: SDUPTHER

## 2021-03-30 RX ORDER — SPIRONOLACTONE 25 MG/1
25 TABLET ORAL DAILY
Qty: 30 TABLET | Refills: 1 | Status: SHIPPED | OUTPATIENT
Start: 2021-03-30 | End: 2021-03-31 | Stop reason: SDUPTHER

## 2021-03-31 RX ORDER — SPIRONOLACTONE 25 MG/1
25 TABLET ORAL DAILY
Qty: 90 TABLET | Refills: 0 | Status: SHIPPED | OUTPATIENT
Start: 2021-03-31 | End: 2021-04-05

## 2021-04-05 ENCOUNTER — OFFICE VISIT (OUTPATIENT)
Dept: FAMILY MEDICINE CLINIC | Facility: CLINIC | Age: 26
End: 2021-04-05

## 2021-04-05 VITALS
TEMPERATURE: 98.2 F | BODY MASS INDEX: 28.7 KG/M2 | WEIGHT: 162 LBS | SYSTOLIC BLOOD PRESSURE: 100 MMHG | HEART RATE: 88 BPM | HEIGHT: 63 IN | OXYGEN SATURATION: 99 % | DIASTOLIC BLOOD PRESSURE: 62 MMHG

## 2021-04-05 DIAGNOSIS — L73.0 ACNE SCARRING: ICD-10-CM

## 2021-04-05 DIAGNOSIS — J45.20 MILD INTERMITTENT ASTHMA WITHOUT COMPLICATION: ICD-10-CM

## 2021-04-05 DIAGNOSIS — B00.9 HSV INFECTION: ICD-10-CM

## 2021-04-05 DIAGNOSIS — Z00.00 ANNUAL PHYSICAL EXAM: Primary | ICD-10-CM

## 2021-04-05 DIAGNOSIS — J30.2 SEASONAL ALLERGIC RHINITIS, UNSPECIFIED TRIGGER: ICD-10-CM

## 2021-04-05 DIAGNOSIS — Z30.41 FAMILY PLANNING, BCP (BIRTH CONTROL PILLS) MAINTENANCE: ICD-10-CM

## 2021-04-05 DIAGNOSIS — E55.9 VITAMIN D DEFICIENCY: ICD-10-CM

## 2021-04-05 LAB
25(OH)D3 SERPL-MCNC: 49.2 NG/ML (ref 30–100)
ALBUMIN SERPL-MCNC: 4.1 G/DL (ref 3.5–5.2)
ALBUMIN/GLOB SERPL: 1.6 G/DL
ALP SERPL-CCNC: 33 U/L (ref 39–117)
ALT SERPL W P-5'-P-CCNC: 66 U/L (ref 1–33)
ANION GAP SERPL CALCULATED.3IONS-SCNC: 9.1 MMOL/L (ref 5–15)
AST SERPL-CCNC: 111 U/L (ref 1–32)
BASOPHILS # BLD AUTO: 0.04 10*3/MM3 (ref 0–0.2)
BASOPHILS NFR BLD AUTO: 0.8 % (ref 0–1.5)
BILIRUB SERPL-MCNC: 0.2 MG/DL (ref 0–1.2)
BUN SERPL-MCNC: 9 MG/DL (ref 6–20)
BUN/CREAT SERPL: 11.5 (ref 7–25)
CALCIUM SPEC-SCNC: 9.6 MG/DL (ref 8.6–10.5)
CHLORIDE SERPL-SCNC: 105 MMOL/L (ref 98–107)
CO2 SERPL-SCNC: 23.9 MMOL/L (ref 22–29)
CREAT SERPL-MCNC: 0.78 MG/DL (ref 0.57–1)
DEPRECATED RDW RBC AUTO: 40.6 FL (ref 37–54)
EOSINOPHIL # BLD AUTO: 0.06 10*3/MM3 (ref 0–0.4)
EOSINOPHIL NFR BLD AUTO: 1.2 % (ref 0.3–6.2)
ERYTHROCYTE [DISTWIDTH] IN BLOOD BY AUTOMATED COUNT: 12.2 % (ref 12.3–15.4)
GFR SERPL CREATININE-BSD FRML MDRD: 109 ML/MIN/1.73
GLOBULIN UR ELPH-MCNC: 2.5 GM/DL
GLUCOSE SERPL-MCNC: 95 MG/DL (ref 65–99)
HCT VFR BLD AUTO: 39.8 % (ref 34–46.6)
HGB BLD-MCNC: 13 G/DL (ref 12–15.9)
IMM GRANULOCYTES # BLD AUTO: 0.02 10*3/MM3 (ref 0–0.05)
IMM GRANULOCYTES NFR BLD AUTO: 0.4 % (ref 0–0.5)
LYMPHOCYTES # BLD AUTO: 1.95 10*3/MM3 (ref 0.7–3.1)
LYMPHOCYTES NFR BLD AUTO: 38.5 % (ref 19.6–45.3)
MCH RBC QN AUTO: 30 PG (ref 26.6–33)
MCHC RBC AUTO-ENTMCNC: 32.7 G/DL (ref 31.5–35.7)
MCV RBC AUTO: 91.9 FL (ref 79–97)
MONOCYTES # BLD AUTO: 0.29 10*3/MM3 (ref 0.1–0.9)
MONOCYTES NFR BLD AUTO: 5.7 % (ref 5–12)
NEUTROPHILS NFR BLD AUTO: 2.7 10*3/MM3 (ref 1.7–7)
NEUTROPHILS NFR BLD AUTO: 53.4 % (ref 42.7–76)
NRBC BLD AUTO-RTO: 0.2 /100 WBC (ref 0–0.2)
PLATELET # BLD AUTO: 217 10*3/MM3 (ref 140–450)
PMV BLD AUTO: 11.9 FL (ref 6–12)
POTASSIUM SERPL-SCNC: 4.7 MMOL/L (ref 3.5–5.2)
PROT SERPL-MCNC: 6.6 G/DL (ref 6–8.5)
RBC # BLD AUTO: 4.33 10*6/MM3 (ref 3.77–5.28)
SODIUM SERPL-SCNC: 138 MMOL/L (ref 136–145)
TSH SERPL DL<=0.05 MIU/L-ACNC: 2 UIU/ML (ref 0.27–4.2)
WBC # BLD AUTO: 5.06 10*3/MM3 (ref 3.4–10.8)

## 2021-04-05 PROCEDURE — 36415 COLL VENOUS BLD VENIPUNCTURE: CPT | Performed by: NURSE PRACTITIONER

## 2021-04-05 PROCEDURE — 80053 COMPREHEN METABOLIC PANEL: CPT | Performed by: NURSE PRACTITIONER

## 2021-04-05 PROCEDURE — 82306 VITAMIN D 25 HYDROXY: CPT | Performed by: NURSE PRACTITIONER

## 2021-04-05 PROCEDURE — 84443 ASSAY THYROID STIM HORMONE: CPT | Performed by: NURSE PRACTITIONER

## 2021-04-05 PROCEDURE — 85025 COMPLETE CBC W/AUTO DIFF WBC: CPT | Performed by: NURSE PRACTITIONER

## 2021-04-05 PROCEDURE — 99395 PREV VISIT EST AGE 18-39: CPT | Performed by: NURSE PRACTITIONER

## 2021-04-05 RX ORDER — CETIRIZINE HYDROCHLORIDE 10 MG/1
10 TABLET ORAL DAILY
Qty: 90 TABLET | Refills: 3 | Status: SHIPPED | OUTPATIENT
Start: 2021-04-05

## 2021-04-05 RX ORDER — ERGOCALCIFEROL 1.25 MG/1
50000 CAPSULE ORAL WEEKLY
Qty: 13 CAPSULE | Refills: 1 | Status: SHIPPED | OUTPATIENT
Start: 2021-04-05

## 2021-04-05 RX ORDER — ALBUTEROL SULFATE 90 UG/1
2 AEROSOL, METERED RESPIRATORY (INHALATION) EVERY 4 HOURS PRN
Qty: 18 G | Refills: 3 | Status: SHIPPED | OUTPATIENT
Start: 2021-04-05

## 2021-04-05 RX ORDER — ACYCLOVIR 400 MG/1
TABLET ORAL
Qty: 15 TABLET | Refills: 5 | Status: SHIPPED | OUTPATIENT
Start: 2021-04-05

## 2021-04-05 RX ORDER — EPINEPHRINE 0.3 MG/.3ML
0.3 INJECTION SUBCUTANEOUS ONCE
Qty: 1 EACH | Refills: 1 | Status: SHIPPED | OUTPATIENT
Start: 2021-04-05 | End: 2021-04-05

## 2021-04-05 NOTE — PROGRESS NOTES
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, last vision > 1 year wears contacts needs new glasses, last dental  UTD  With chronic acne and restarted OCP and seems to help, Last pap  normal, not sexually active since last STD screening  defer STD screen update today, no vag or urin sx, prev tried spironolactone but fatigue stopped after a week, with vit D def still taking weekly vit D unsure if helping, also taking MVI, some restless sleep prev took elavil too sedating, got VI peel  and helped  With chronic allergies some increased usage albuterol with season trigger no antihistamine, hx of childhood asthma with some wheezing worse HS, last epipen   With chronic HSV last outbreak > 1 year request refill acyclovir prn    Objective   Vital Signs:   /62 (BP Location: Left arm, Patient Position: Sitting, Cuff Size: Adult)   Pulse 88   Temp 98.2 °F (36.8 °C) (Infrared)   Ht 160 cm (62.99\")   Wt 73.5 kg (162 lb)   SpO2 99%   BMI 28.70 kg/m²     Physical Exam  Vitals reviewed.   Constitutional:       Appearance: She is well-developed.   HENT:      Head: Normocephalic and atraumatic.      Right Ear: Hearing and tympanic membrane normal.      Left Ear: Hearing and tympanic membrane normal.   Eyes:      Conjunctiva/sclera: Conjunctivae normal.      Pupils: Pupils are equal, round, and reactive to light.   Cardiovascular:      Rate and Rhythm: Normal rate and regular rhythm.      Pulses: Normal pulses.      Heart sounds: Normal heart sounds.   Pulmonary:      Effort: Pulmonary effort is normal.      Breath sounds: Normal breath sounds.   Abdominal:      General: There is no distension.      Palpations: Abdomen is soft. " There is no mass.      Tenderness: There is no abdominal tenderness. There is no right CVA tenderness, left CVA tenderness, guarding or rebound.      Hernia: No hernia is present.   Musculoskeletal:         General: Normal range of motion.      Cervical back: Normal range of motion.   Skin:     General: Skin is warm and dry.   Neurological:      Mental Status: She is alert and oriented to person, place, and time.   Psychiatric:         Mood and Affect: Mood normal.         Behavior: Behavior normal.         Thought Content: Thought content normal.         Judgment: Judgment normal.        Result Review :                 Assessment and Plan {CC Problem List  Visit Diagnosis  ROS  Review (Popup)  Gaming for Good Maintenance  Quality  BestPractice  Medications  SmartSets  SnapShot Encounters  Media :23}   Diagnoses and all orders for this visit:    1. Annual physical exam (Primary)  -     CBC & Differential  -     Comprehensive Metabolic Panel  -     TSH  -     Vitamin D 25 Hydroxy  -     Urinalysis With Microscopic - Urine, Clean Catch    2. Vitamin D deficiency    3. Seasonal allergic rhinitis, unspecified trigger    4. Mild intermittent asthma without complication    5. Acne scarring    6. Family planning, BCP (birth control pills) maintenance    7. HSV infection    Other orders  -     albuterol sulfate  (90 Base) MCG/ACT inhaler; Inhale 2 puffs Every 4 (Four) Hours As Needed for Wheezing.  Dispense: 18 g; Refill: 3  -     cetirizine (zyrTEC) 10 MG tablet; Take 1 tablet by mouth Daily.  Dispense: 90 tablet; Refill: 3  -     EPINEPHrine (EpiPen 2-Aurelio) 0.3 MG/0.3ML solution auto-injector injection; Inject 0.3 mL into the appropriate muscle as directed by prescriber 1 (One) Time for 1 dose.  Dispense: 1 each; Refill: 1  -     acyclovir (Zovirax) 400 MG tablet; 1 PO TID x 5 days  Dispense: 15 tablet; Refill: 5  -     vitamin D (ERGOCALCIFEROL) 1.25 MG (26995 UT) capsule capsule; Take 1 capsule by mouth 1 (One)  Time Per Week.  Dispense: 13 capsule; Refill: 1        Follow Up   Return in about 1 year (around 4/5/2022).  Patient was given instructions and counseling regarding her condition or for health maintenance advice. Please see specific information pulled into the AVS if appropriate.   Complete physical today, check labs and call with results, refill chronic dz meds start zyrtec 10 mg daily and monitor asthma and allergies, start melatonin HS prn and monitor fatigue, UTD pap cont OCP daily plan for repeat peel 05/21, enc overdue vision exam

## 2021-04-05 NOTE — PATIENT INSTRUCTIONS
Complete physical today, check labs and call with results, refill chronic dz meds start zyrtec 10 mg daily and monitor asthma and allergies, start melatonin HS prn and monitor fatigue, UTD pap cont OCP daily plan for repeat peel 05/21, enc overdue vision exam

## 2021-04-09 ENCOUNTER — TELEPHONE (OUTPATIENT)
Dept: FAMILY MEDICINE CLINIC | Facility: CLINIC | Age: 26
End: 2021-04-09

## 2021-04-09 NOTE — TELEPHONE ENCOUNTER
Pt informed, said she had been taking tylenol for headaches  Pt was unable to leave a urine the day she was here, wants to know if it is needed, she isn't having any symptoms      Vit D now normal around 50. Thyroid, blood sugar, kidney normal but liver enzymes elevated. Is she drinking alcohol regularly or taking tylenol? Any supplements OTC? Suspect could be contributing to your fatigue

## 2021-04-12 RX ORDER — NAPROXEN 500 MG/1
500 TABLET ORAL 2 TIMES DAILY WITH MEALS
Qty: 60 TABLET | Refills: 1 | Status: SHIPPED | OUTPATIENT
Start: 2021-04-12

## 2021-04-12 NOTE — TELEPHONE ENCOUNTER
Yes it appears the tylenol is upsetting her liver. Recommend stop and change to aleve or advil after meals with water if needed and if headaches persist make FU apt to discuss other medication. Recommend lab recheck in a month. Urine is not necessary

## 2021-04-12 NOTE — TELEPHONE ENCOUNTER
Patient informed, she is wanting a refill on her naproxen to [facility]. She said will take that for headaches and cramping instead of tylenol.

## 2021-05-20 DIAGNOSIS — R74.8 ELEVATED LIVER ENZYMES: Primary | ICD-10-CM

## 2021-05-20 DIAGNOSIS — Z30.011 FAMILY PLANNING, BCP (BIRTH CONTROL PILLS) INITIAL PRESCRIPTION: ICD-10-CM

## 2021-06-01 ENCOUNTER — LAB (OUTPATIENT)
Dept: FAMILY MEDICINE CLINIC | Facility: CLINIC | Age: 26
End: 2021-06-01

## 2021-06-01 DIAGNOSIS — Z30.011 FAMILY PLANNING, BCP (BIRTH CONTROL PILLS) INITIAL PRESCRIPTION: ICD-10-CM

## 2021-06-01 DIAGNOSIS — R74.8 ELEVATED LIVER ENZYMES: ICD-10-CM

## 2021-06-01 LAB
ALBUMIN SERPL-MCNC: 3.9 G/DL (ref 3.5–5.2)
ALBUMIN/GLOB SERPL: 1.3 G/DL
ALP SERPL-CCNC: 49 U/L (ref 39–117)
ALT SERPL W P-5'-P-CCNC: 15 U/L (ref 1–33)
ANION GAP SERPL CALCULATED.3IONS-SCNC: 9.5 MMOL/L (ref 5–15)
AST SERPL-CCNC: 20 U/L (ref 1–32)
B-HCG UR QL: NEGATIVE
BILIRUB SERPL-MCNC: <0.2 MG/DL (ref 0–1.2)
BUN SERPL-MCNC: 10 MG/DL (ref 6–20)
BUN/CREAT SERPL: 14.3 (ref 7–25)
CALCIUM SPEC-SCNC: 9.3 MG/DL (ref 8.6–10.5)
CHLORIDE SERPL-SCNC: 105 MMOL/L (ref 98–107)
CO2 SERPL-SCNC: 25.5 MMOL/L (ref 22–29)
CREAT SERPL-MCNC: 0.7 MG/DL (ref 0.57–1)
GFR SERPL CREATININE-BSD FRML MDRD: 124 ML/MIN/1.73
GLOBULIN UR ELPH-MCNC: 3 GM/DL
GLUCOSE SERPL-MCNC: 103 MG/DL (ref 65–99)
POTASSIUM SERPL-SCNC: 4.2 MMOL/L (ref 3.5–5.2)
PROT SERPL-MCNC: 6.9 G/DL (ref 6–8.5)
SODIUM SERPL-SCNC: 140 MMOL/L (ref 136–145)

## 2021-06-01 PROCEDURE — 36415 COLL VENOUS BLD VENIPUNCTURE: CPT | Performed by: NURSE PRACTITIONER

## 2021-06-01 PROCEDURE — 80053 COMPREHEN METABOLIC PANEL: CPT | Performed by: NURSE PRACTITIONER

## 2021-06-01 PROCEDURE — 81025 URINE PREGNANCY TEST: CPT | Performed by: NURSE PRACTITIONER

## 2021-06-03 RX ORDER — DROSPIRENONE AND ETHINYL ESTRADIOL 0.03MG-3MG
1 KIT ORAL DAILY
Qty: 30 TABLET | Refills: 11 | Status: SHIPPED | OUTPATIENT
Start: 2021-06-03

## 2021-06-03 RX ORDER — TRETINOIN 0.25 MG/G
GEL TOPICAL NIGHTLY
Qty: 1 EACH | Refills: 1 | Status: SHIPPED | OUTPATIENT
Start: 2021-06-03 | End: 2021-09-15 | Stop reason: SDUPTHER

## 2021-09-02 ENCOUNTER — TELEPHONE (OUTPATIENT)
Dept: FAMILY MEDICINE CLINIC | Facility: CLINIC | Age: 26
End: 2021-09-02

## 2021-09-02 NOTE — TELEPHONE ENCOUNTER
Caller: Celestino Ba    Relationship: Self    Best call back number: [phone]     What is the best time to reach you: ANY TIME    Who are you requesting to speak with (clinical staff, provider,  specific staff member): MARCELINA CAGLE    What was the call regarding: PATIENT TRIED TO RESCHEDULE HER PHYSICAL WITH PAP EARLIER BECAUSE HER INSURANCE WILL BE EXPIRING THE DAY OF HER APPOINTMENT (9/30/2021). I COULD NOT FIND ANYTHING SOONER FOR HER, SO SHE WANTED TO SEE IF MARCELINA WAS ABLE TO FIT HER IN ANY TIME SOONER THAN THE 30TH.    PLEASE CALL AND ADVISE PATIENT.

## 2021-09-15 ENCOUNTER — OFFICE VISIT (OUTPATIENT)
Dept: FAMILY MEDICINE CLINIC | Facility: CLINIC | Age: 26
End: 2021-09-15

## 2021-09-15 VITALS
DIASTOLIC BLOOD PRESSURE: 70 MMHG | HEART RATE: 90 BPM | OXYGEN SATURATION: 99 % | HEIGHT: 63 IN | TEMPERATURE: 99.1 F | SYSTOLIC BLOOD PRESSURE: 110 MMHG | WEIGHT: 169 LBS | BODY MASS INDEX: 29.95 KG/M2

## 2021-09-15 DIAGNOSIS — L70.9 ACNE, UNSPECIFIED ACNE TYPE: ICD-10-CM

## 2021-09-15 DIAGNOSIS — Z30.41 FAMILY PLANNING, BCP (BIRTH CONTROL PILLS) MAINTENANCE: ICD-10-CM

## 2021-09-15 DIAGNOSIS — Z01.419 ENCOUNTER FOR GYNECOLOGICAL EXAMINATION: Primary | ICD-10-CM

## 2021-09-15 LAB
B-HCG UR QL: NEGATIVE
BACTERIA UR QL AUTO: ABNORMAL /HPF
BILIRUB UR QL STRIP: NEGATIVE
CLARITY UR: CLEAR
COLOR UR: YELLOW
GLUCOSE UR STRIP-MCNC: NEGATIVE MG/DL
HGB UR QL STRIP.AUTO: ABNORMAL
HYALINE CASTS UR QL AUTO: ABNORMAL /LPF
KETONES UR QL STRIP: NEGATIVE
LEUKOCYTE ESTERASE UR QL STRIP.AUTO: NEGATIVE
NITRITE UR QL STRIP: NEGATIVE
PH UR STRIP.AUTO: 5.5 [PH] (ref 4.6–8)
PROT UR QL STRIP: NEGATIVE
RBC # UR: ABNORMAL /HPF
REF LAB TEST METHOD: ABNORMAL
SP GR UR STRIP: 1.02 (ref 1–1.03)
SQUAMOUS #/AREA URNS HPF: ABNORMAL /HPF
UROBILINOGEN UR QL STRIP: ABNORMAL
WBC UR QL AUTO: ABNORMAL /HPF

## 2021-09-15 PROCEDURE — 81025 URINE PREGNANCY TEST: CPT | Performed by: NURSE PRACTITIONER

## 2021-09-15 PROCEDURE — 99395 PREV VISIT EST AGE 18-39: CPT | Performed by: NURSE PRACTITIONER

## 2021-09-15 PROCEDURE — 81001 URINALYSIS AUTO W/SCOPE: CPT | Performed by: NURSE PRACTITIONER

## 2021-09-15 RX ORDER — TRETINOIN 0.25 MG/G
GEL TOPICAL NIGHTLY
Qty: 1 EACH | Refills: 1 | Status: SHIPPED | OUTPATIENT
Start: 2021-09-15

## 2021-09-15 NOTE — PROGRESS NOTES
"Chief Complaint  Gynecologic Exam (yearly) and Amenorrhea (missed period last month)    Subjective          Celestino Ba presents to [facility]  History of Present Illness  Celestino Ba 25 y.o. female  0, Para 0 female who presents for their yearly GYN exam. Periods are regular every 28-30 days, lasting 5 days. Missed last month d/t stopping OCP half way through and restarted new pack this week. No recent unprotected intercourse during that time. Dysmenorrhea: mild, occurring first 1-2 days of flow. Cyclic symptoms include bloating and moodiness. No intermenstrual bleeding, spotting, or discharge. No vag dc, odor, itch, pain, dysuria, breast pain, lumps, nipple discharge. Last pap  Neg normal. She has seen Dr. Roseann Hurtado GYN for hx of abnormal pap 2 years in a row with HPV positive, she did have gardasil shots.   Current contraception: dalia to help with acne as well and has started retin a topical with improvement in acne scarring  History of abnormal Pap smear: yes - , ASCUS, HPV positive,  Neg  Family history of uterine or ovarian cancer: no  Family history of colon cancer: no  History of abnormal mammogram: no  Family history of breast cancer: yes - PGM, unsure of age  Patient declines STD screening. Last STD screening , same sexual partner, last herpes outbreak last week attributes to stress of work but prev outbreak > 1 year, has outbreak acyclovir at pharmacy prn    Objective   Vital Signs:   /70 (BP Location: Left arm, Patient Position: Sitting, Cuff Size: Adult)   Pulse 90   Temp 99.1 °F (37.3 °C) (Infrared)   Ht 160 cm (62.99\")   Wt 76.7 kg (169 lb)   SpO2 99%   BMI 29.94 kg/m²     Physical Exam  Vitals reviewed.   Constitutional:       Appearance: She is well-developed.   HENT:      Head: Normocephalic and atraumatic.   Eyes:      Conjunctiva/sclera: Conjunctivae normal.      Pupils: Pupils are equal, round, and reactive to light. "   Cardiovascular:      Rate and Rhythm: Normal rate and regular rhythm.      Pulses: Normal pulses.      Heart sounds: Normal heart sounds.   Pulmonary:      Effort: Pulmonary effort is normal.      Breath sounds: Normal breath sounds.   Chest:      Breasts:         Right: Normal.         Left: Normal.   Genitourinary:     Vagina: Normal.      Cervix: Normal.      Uterus: Normal.       Adnexa: Right adnexa normal and left adnexa normal.   Musculoskeletal:         General: Normal range of motion.      Cervical back: Normal range of motion.   Skin:     General: Skin is warm and dry.   Neurological:      Mental Status: She is alert and oriented to person, place, and time.   Psychiatric:         Mood and Affect: Mood normal.         Behavior: Behavior normal.         Thought Content: Thought content normal.         Judgment: Judgment normal.        Result Review :                 Assessment and Plan    Diagnoses and all orders for this visit:    1. Encounter for gynecological examination (Primary)  -     Pregnancy, Urine - Urine, Clean Catch  -     Urinalysis With Microscopic - Urine, Clean Catch  -     Pap IG (Image Guided)    2. Family planning, BCP (birth control pills) maintenance  -     Pregnancy, Urine - Urine, Clean Catch    3. Acne, unspecified acne type    Other orders  -     tretinoin (Retin-A) 0.025 % gel; Apply  topically to the appropriate area as directed Every Night.  Dispense: 1 each; Refill: 1        Follow Up   No follow-ups on file.  Patient was given instructions and counseling regarding her condition or for health maintenance advice. Please see specific information pulled into the AVS if appropriate.   CBE, pap, pelvic, UA and HCG today, continue daily OCP no missed doses, refill retin A topical every other night with SPF during the day

## 2021-09-15 NOTE — PATIENT INSTRUCTIONS
CBE, pap, pelvic, UA and HCG today, continue daily OCP no missed doses, refill retin A topical every other night with SPF during the day

## 2021-09-17 LAB
CYTOLOGIST CVX/VAG CYTO: NORMAL
CYTOLOGY CVX/VAG DOC CYTO: NORMAL
CYTOLOGY CVX/VAG DOC THIN PREP: NORMAL
DX ICD CODE: NORMAL
HIV 1 & 2 AB SER-IMP: NORMAL
OTHER STN SPEC: NORMAL
STAT OF ADQ CVX/VAG CYTO-IMP: NORMAL

## 2021-09-30 ENCOUNTER — TELEPHONE (OUTPATIENT)
Dept: FAMILY MEDICINE CLINIC | Facility: CLINIC | Age: 26
End: 2021-09-30

## 2021-09-30 RX ORDER — EPINEPHRINE 0.3 MG/.3ML
0.3 INJECTION SUBCUTANEOUS ONCE
Qty: 1 EACH | Refills: 0 | Status: SHIPPED | OUTPATIENT
Start: 2021-09-30 | End: 2021-09-30

## 2021-09-30 RX ORDER — EPINEPHRINE 0.3 MG/.3ML
INJECTION SUBCUTANEOUS
COMMUNITY
End: 2021-09-30 | Stop reason: SDUPTHER

## 2021-09-30 NOTE — TELEPHONE ENCOUNTER
PATIENT CALLED TO REQUEST REFILLS OF EPIPEN  [id number] [place] [facility] - [place], [place] - [address] - [phone]  - [phone] FX

## 2021-09-30 NOTE — TELEPHONE ENCOUNTER
Will send in 1 refill but leonardo no longer in office, needs new pcp or can get refills from allergist if she sees one

## 2024-01-22 NOTE — TELEPHONE ENCOUNTER
Pt informed    ----- Message from KATHERYN Del Real sent at 2/23/2018  8:50 AM EST -----  Pap shows atypical cells again, HPV positive, we can recheck again in 1 year. STD screen all negative     Post-Care Instructions: I reviewed with the patient in detail post-care instructions. Patient is to avoid picking at any of the treated lesions. Pt may apply Vaseline to crusted or scabbing areas.  Follow up in 6 weeks if not resolved. Detail Level: Zone Render In Bullet Format When Appropriate: No Total Number Of Aks Treated: 7 Number Of Freeze-Thaw Cycles: 2 freeze-thaw cycles Consent: The patient's consent was obtained including but not limited to risks of crusting, scabbing, blistering, scarring, darker or lighter pigmentary change, recurrence, incomplete removal and infection. Duration Of Freeze Thaw-Cycle (Seconds): 2 Render Post-Care Instructions In Note?: yes